# Patient Record
Sex: FEMALE | Race: OTHER | Employment: FULL TIME | URBAN - METROPOLITAN AREA
[De-identification: names, ages, dates, MRNs, and addresses within clinical notes are randomized per-mention and may not be internally consistent; named-entity substitution may affect disease eponyms.]

---
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
## 2019-01-06 ENCOUNTER — APPOINTMENT (OUTPATIENT)
Dept: GENERAL RADIOLOGY | Age: 19
End: 2019-01-06
Payer: COMMERCIAL

## 2019-01-06 ENCOUNTER — HOSPITAL ENCOUNTER (EMERGENCY)
Age: 19
Discharge: HOME | End: 2019-01-06
Attending: EMERGENCY MEDICINE
Payer: COMMERCIAL

## 2019-01-06 VITALS
BODY MASS INDEX: 21.34 KG/M2 | RESPIRATION RATE: 17 BRPM | DIASTOLIC BLOOD PRESSURE: 68 MMHG | SYSTOLIC BLOOD PRESSURE: 116 MMHG | HEIGHT: 64 IN | HEART RATE: 65 BPM | TEMPERATURE: 97.9 F | OXYGEN SATURATION: 100 % | WEIGHT: 125 LBS

## 2019-01-06 DIAGNOSIS — R05.9 COUGH: Primary | ICD-10-CM

## 2019-01-06 LAB
RAPID INFLUENZA  B AGN: NEGATIVE
RAPID INFLUENZA A AGN: NEGATIVE

## 2019-01-06 PROCEDURE — 71045 X-RAY EXAM CHEST 1 VIEW: CPT

## 2019-01-06 PROCEDURE — 99283 EMERGENCY DEPT VISIT LOW MDM: CPT

## 2019-01-06 PROCEDURE — 96374 THER/PROPH/DIAG INJ IV PUSH: CPT

## 2019-01-06 PROCEDURE — 94640 AIRWAY INHALATION TREATMENT: CPT

## 2019-01-06 PROCEDURE — 87804 INFLUENZA ASSAY W/OPTIC: CPT

## 2019-01-06 PROCEDURE — 6360000002 HC RX W HCPCS: Performed by: PERSONAL EMERGENCY RESPONSE ATTENDANT

## 2019-01-06 PROCEDURE — 6370000000 HC RX 637 (ALT 250 FOR IP): Performed by: PERSONAL EMERGENCY RESPONSE ATTENDANT

## 2019-01-06 RX ORDER — DEXAMETHASONE SODIUM PHOSPHATE 10 MG/ML
10 INJECTION INTRAMUSCULAR; INTRAVENOUS ONCE
Status: COMPLETED | OUTPATIENT
Start: 2019-01-06 | End: 2019-01-06

## 2019-01-06 RX ORDER — ALBUTEROL SULFATE 90 UG/1
2 AEROSOL, METERED RESPIRATORY (INHALATION) EVERY 6 HOURS PRN
COMMUNITY
End: 2019-01-06

## 2019-01-06 RX ORDER — AZITHROMYCIN 250 MG/1
TABLET, FILM COATED ORAL
Qty: 1 PACKET | Refills: 0 | Status: SHIPPED | OUTPATIENT
Start: 2019-01-06 | End: 2019-01-16

## 2019-01-06 RX ORDER — ALBUTEROL SULFATE 2.5 MG/3ML
2.5 SOLUTION RESPIRATORY (INHALATION) EVERY 6 HOURS PRN
Qty: 120 EACH | Refills: 0 | Status: SHIPPED | OUTPATIENT
Start: 2019-01-06

## 2019-01-06 RX ORDER — IPRATROPIUM BROMIDE AND ALBUTEROL SULFATE 2.5; .5 MG/3ML; MG/3ML
1 SOLUTION RESPIRATORY (INHALATION) CONTINUOUS PRN
Status: DISCONTINUED | OUTPATIENT
Start: 2019-01-06 | End: 2019-01-06 | Stop reason: HOSPADM

## 2019-01-06 RX ORDER — CODEINE PHOSPHATE AND GUAIFENESIN 10; 100 MG/5ML; MG/5ML
10 SOLUTION ORAL ONCE
Status: COMPLETED | OUTPATIENT
Start: 2019-01-06 | End: 2019-01-06

## 2019-01-06 RX ADMIN — IPRATROPIUM BROMIDE AND ALBUTEROL SULFATE 1 AMPULE: .5; 3 SOLUTION RESPIRATORY (INHALATION) at 01:28

## 2019-01-06 RX ADMIN — GUAIFENESIN AND CODEINE PHOSPHATE 10 ML: 10; 100 LIQUID ORAL at 01:46

## 2019-01-06 RX ADMIN — DEXAMETHASONE SODIUM PHOSPHATE 10 MG: 10 INJECTION INTRAMUSCULAR; INTRAVENOUS at 01:46

## 2019-01-06 ASSESSMENT — ENCOUNTER SYMPTOMS
VOMITING: 0
COLOR CHANGE: 0
BLOOD IN STOOL: 0
COUGH: 1
ABDOMINAL PAIN: 0
SHORTNESS OF BREATH: 1
RHINORRHEA: 0
DIARRHEA: 0
SORE THROAT: 1
NAUSEA: 0

## 2019-01-06 ASSESSMENT — PAIN DESCRIPTION - DESCRIPTORS: DESCRIPTORS: ACHING

## 2019-01-06 ASSESSMENT — PAIN SCALES - GENERAL: PAINLEVEL_OUTOF10: 10

## 2019-01-06 ASSESSMENT — PAIN DESCRIPTION - LOCATION: LOCATION: ABDOMEN;CHEST

## 2019-01-06 ASSESSMENT — PAIN DESCRIPTION - PAIN TYPE: TYPE: ACUTE PAIN

## 2019-02-28 ENCOUNTER — HOSPITAL ENCOUNTER (EMERGENCY)
Age: 19
Discharge: HOME | End: 2019-02-28
Payer: COMMERCIAL

## 2019-02-28 VITALS
BODY MASS INDEX: 23.05 KG/M2 | TEMPERATURE: 102.2 F | RESPIRATION RATE: 19 BRPM | HEIGHT: 64 IN | WEIGHT: 135 LBS | SYSTOLIC BLOOD PRESSURE: 138 MMHG | OXYGEN SATURATION: 95 % | HEART RATE: 93 BPM | DIASTOLIC BLOOD PRESSURE: 77 MMHG

## 2019-02-28 DIAGNOSIS — J10.1 INFLUENZA A: Primary | ICD-10-CM

## 2019-02-28 LAB
RAPID INFLUENZA  B AGN: NEGATIVE
RAPID INFLUENZA A AGN: POSITIVE
S PYO AG THROAT QL: NEGATIVE

## 2019-02-28 PROCEDURE — 87804 INFLUENZA ASSAY W/OPTIC: CPT

## 2019-02-28 PROCEDURE — 99283 EMERGENCY DEPT VISIT LOW MDM: CPT

## 2019-02-28 PROCEDURE — 87880 STREP A ASSAY W/OPTIC: CPT

## 2019-02-28 PROCEDURE — 6370000000 HC RX 637 (ALT 250 FOR IP): Performed by: PHYSICIAN ASSISTANT

## 2019-02-28 PROCEDURE — 87081 CULTURE SCREEN ONLY: CPT

## 2019-02-28 RX ORDER — ACETAMINOPHEN 500 MG
1000 TABLET ORAL ONCE
Status: COMPLETED | OUTPATIENT
Start: 2019-02-28 | End: 2019-02-28

## 2019-02-28 RX ORDER — PREDNISONE 50 MG/1
50 TABLET ORAL DAILY
Qty: 5 TABLET | Refills: 0 | Status: SHIPPED | OUTPATIENT
Start: 2019-02-28 | End: 2019-03-05

## 2019-02-28 RX ORDER — OSELTAMIVIR PHOSPHATE 75 MG/1
75 CAPSULE ORAL 2 TIMES DAILY
Qty: 10 CAPSULE | Refills: 0 | Status: SHIPPED | OUTPATIENT
Start: 2019-02-28 | End: 2019-03-05

## 2019-02-28 RX ORDER — BENZONATATE 100 MG/1
100 CAPSULE ORAL ONCE
Status: COMPLETED | OUTPATIENT
Start: 2019-02-28 | End: 2019-02-28

## 2019-02-28 RX ORDER — BENZONATATE 100 MG/1
100 CAPSULE ORAL 2 TIMES DAILY PRN
Qty: 20 CAPSULE | Refills: 0 | Status: SHIPPED | OUTPATIENT
Start: 2019-02-28 | End: 2019-03-07

## 2019-02-28 RX ORDER — PREDNISONE 20 MG/1
60 TABLET ORAL ONCE
Status: COMPLETED | OUTPATIENT
Start: 2019-02-28 | End: 2019-02-28

## 2019-02-28 RX ADMIN — BENZONATATE 100 MG: 100 CAPSULE ORAL at 10:15

## 2019-02-28 RX ADMIN — PREDNISONE 60 MG: 20 TABLET ORAL at 10:15

## 2019-02-28 RX ADMIN — ACETAMINOPHEN 1000 MG: 500 TABLET ORAL at 10:15

## 2019-02-28 ASSESSMENT — PAIN DESCRIPTION - FREQUENCY: FREQUENCY: CONTINUOUS

## 2019-02-28 ASSESSMENT — ENCOUNTER SYMPTOMS
SHORTNESS OF BREATH: 0
DIARRHEA: 0
RHINORRHEA: 1
COUGH: 1
ABDOMINAL PAIN: 0
VOMITING: 0
EYE PAIN: 0
NAUSEA: 0
PHOTOPHOBIA: 0
BACK PAIN: 0
SORE THROAT: 1

## 2019-02-28 ASSESSMENT — PAIN DESCRIPTION - PAIN TYPE: TYPE: ACUTE PAIN

## 2019-02-28 ASSESSMENT — PAIN SCALES - GENERAL
PAINLEVEL_OUTOF10: 10
PAINLEVEL_OUTOF10: 10

## 2019-02-28 ASSESSMENT — PAIN DESCRIPTION - DESCRIPTORS: DESCRIPTORS: ACHING

## 2019-02-28 ASSESSMENT — PAIN DESCRIPTION - LOCATION: LOCATION: GENERALIZED;HEAD

## 2019-03-02 LAB — S PYO THROAT QL CULT: NORMAL

## 2021-02-21 ENCOUNTER — APPOINTMENT (OUTPATIENT)
Dept: GENERAL RADIOLOGY | Age: 21
End: 2021-02-21
Payer: COMMERCIAL

## 2021-02-21 ENCOUNTER — HOSPITAL ENCOUNTER (EMERGENCY)
Age: 21
Discharge: HOME | End: 2021-02-21
Attending: EMERGENCY MEDICINE
Payer: COMMERCIAL

## 2021-02-21 VITALS
BODY MASS INDEX: 30.56 KG/M2 | RESPIRATION RATE: 20 BRPM | SYSTOLIC BLOOD PRESSURE: 151 MMHG | HEART RATE: 88 BPM | HEIGHT: 64 IN | OXYGEN SATURATION: 99 % | TEMPERATURE: 98 F | DIASTOLIC BLOOD PRESSURE: 98 MMHG | WEIGHT: 179 LBS

## 2021-02-21 DIAGNOSIS — K59.00 CONSTIPATION, UNSPECIFIED CONSTIPATION TYPE: Primary | ICD-10-CM

## 2021-02-21 DIAGNOSIS — R10.9 ABDOMINAL PAIN, UNSPECIFIED ABDOMINAL LOCATION: ICD-10-CM

## 2021-02-21 LAB
BACTERIA: NEGATIVE /HPF
BILIRUBIN URINE: NEGATIVE
BLOOD, URINE: ABNORMAL
CHP ED QC CHECK: YES
CLARITY: CLEAR
COLOR: YELLOW
EPITHELIAL CELLS, UA: NORMAL /HPF (ref 0–5)
GLUCOSE URINE: NEGATIVE MG/DL
HYALINE CASTS: NORMAL /HPF (ref 0–5)
KETONES, URINE: NEGATIVE MG/DL
LEUKOCYTE ESTERASE, URINE: NEGATIVE
NITRITE, URINE: NEGATIVE
PH UA: 7 (ref 5–9)
PREGNANCY TEST URINE, POC: NEGATIVE
PROTEIN UA: NEGATIVE MG/DL
RBC UA: NORMAL /HPF (ref 0–5)
SPECIFIC GRAVITY UA: 1 (ref 1–1.03)
UROBILINOGEN, URINE: 0.2 E.U./DL
WBC UA: NORMAL /HPF (ref 0–5)

## 2021-02-21 PROCEDURE — 99283 EMERGENCY DEPT VISIT LOW MDM: CPT

## 2021-02-21 PROCEDURE — 74018 RADEX ABDOMEN 1 VIEW: CPT

## 2021-02-21 PROCEDURE — 81001 URINALYSIS AUTO W/SCOPE: CPT

## 2021-02-21 PROCEDURE — 6370000000 HC RX 637 (ALT 250 FOR IP): Performed by: EMERGENCY MEDICINE

## 2021-02-21 RX ORDER — DOCUSATE SODIUM 100 MG/1
100 CAPSULE, LIQUID FILLED ORAL 2 TIMES DAILY
Qty: 60 CAPSULE | Refills: 0 | Status: SHIPPED | OUTPATIENT
Start: 2021-02-21 | End: 2021-03-23

## 2021-02-21 RX ADMIN — MAGNESIUM CITRATE 296 ML: 1.75 LIQUID ORAL at 12:46

## 2021-02-21 ASSESSMENT — ENCOUNTER SYMPTOMS
VOMITING: 0
CONSTIPATION: 1
BACK PAIN: 0
SHORTNESS OF BREATH: 0
COUGH: 0
ABDOMINAL PAIN: 1
SORE THROAT: 0
NAUSEA: 0
DIARRHEA: 0

## 2021-02-21 ASSESSMENT — PAIN DESCRIPTION - ONSET: ONSET: ON-GOING

## 2021-02-21 ASSESSMENT — PAIN DESCRIPTION - PAIN TYPE: TYPE: ACUTE PAIN

## 2021-02-21 ASSESSMENT — PAIN DESCRIPTION - LOCATION: LOCATION: ABDOMEN

## 2021-02-21 ASSESSMENT — PAIN DESCRIPTION - PROGRESSION: CLINICAL_PROGRESSION: GRADUALLY WORSENING

## 2021-02-21 NOTE — ED TRIAGE NOTES
Patient arrived from home via mother with complaint of constipation for the last 2 weeks. Per patient she has gained a lot of weight recently due to her birth control. patient A&OX4. Patient stated she went a little yesterday, but stool was hard. Patient denies nausea, vomiting.

## 2021-02-21 NOTE — ED NOTES
Pt states that she is unable to void at this time. Ice water given to pt.       [address] [facility] V, RN  02/21/21 3402

## 2021-02-21 NOTE — ED PROVIDER NOTES
[address] [facility] ED  eMERGENCYdEPARTMENT eNCOUnter      Pt Name: Melissa Mast  MRN: [id number]  [place] 2000  Date of evaluation: 2/21/2021  Mario Fuentes MD    CHIEF COMPLAINT           HPI  Melissa Mast is a 23 y.o. female per chart review has no pmh presents to the ED with constipation, ab cramping. Pt notes she has not had a full BM in 2 weeks. Pt had a small hard BM yesterday morning. Pt notes mild, intermittent, cramping, LLQ ab pain. Pt currently denies ab pain. Pt denies fever, n/v, cp, sob, dysuria, diarrhea. ROS  Review of Systems   Constitutional: Negative for activity change, chills and fever. HENT: Negative for ear pain and sore throat. Eyes: Negative for visual disturbance. Respiratory: Negative for cough and shortness of breath. Cardiovascular: Negative for chest pain, palpitations and leg swelling. Gastrointestinal: Positive for abdominal pain and constipation. Negative for diarrhea, nausea and vomiting. Genitourinary: Negative for dysuria. Musculoskeletal: Negative for back pain. Skin: Negative for rash. Neurological: Negative for dizziness and weakness. Except as noted above the remainder of the review of systems was reviewed and negative. PAST MEDICAL HISTORY     Past Medical History:   Diagnosis Date    Ventricular tachycardia ([address]          SURGICAL HISTORY       Past Surgical History:   Procedure Laterality Date    VENTRICULAR ABLATION SURGERY           CURRENTMEDICATIONS       Previous Medications    ALBUTEROL (PROVENTIL) (2.5 MG/3ML) 0.083% NEBULIZER SOLUTION    Take 3 mLs by nebulization every 6 hours as needed for Wheezing    HYDROCORTISONE 2.5 % CREAM    Apply topically 2 times daily. ALLERGIES     Patient has no known allergies. FAMILY HISTORY     History reviewed. No pertinent family history.        SOCIAL HISTORY       Social History     Socioeconomic History    Marital status: Single     Spouse name: None    Number of children: None    Years of education: None    Highest education level: None   Occupational History    None   Social Needs    Financial resource strain: None    Food insecurity     Worry: None     Inability: None    Transportation needs     Medical: None     Non-medical: None   Tobacco Use    Smoking status: Never Smoker    Smokeless tobacco: Never Used   Substance and Sexual Activity    Alcohol use: Yes     Comment: occasionally    Drug use: No    Sexual activity: Never   Lifestyle    Physical activity     Days per week: None     Minutes per session: None    Stress: None   Relationships    Social connections     Talks on phone: None     Gets together: None     Attends Confucianist service: None     Active member of club or organization: None     Attends meetings of clubs or organizations: None     Relationship status: None    Intimate partner violence     Fear of current or ex partner: None     Emotionally abused: None     Physically abused: None     Forced sexual activity: None   Other Topics Concern    None   Social History Narrative    None         PHYSICAL EXAM       ED Triage Vitals [02/21/21 1117]   BP Temp Temp Source Pulse Resp SpO2 Height Weight   (!) 151/98 98 °F (36.7 °C) Oral 88 20 99 % 5' 4\" (1.626 m) 179 lb (81.2 kg)       Physical Exam  Vitals signs and nursing note reviewed. Constitutional:       Appearance: She is well-developed. HENT:      Head: Normocephalic. Right Ear: External ear normal.      Left Ear: External ear normal.   Eyes:      Conjunctiva/sclera: Conjunctivae normal.      Pupils: Pupils are equal, round, and reactive to light. Neck:      Musculoskeletal: Normal range of motion and neck supple. Cardiovascular:      Rate and Rhythm: Normal rate and regular rhythm. Heart sounds: Normal heart sounds. Pulmonary:      Effort: Pulmonary effort is normal.      Breath sounds: Normal breath sounds.    Abdominal:      General: Bowel sounds are normal. There is no distension. Palpations: Abdomen is soft. Tenderness: There is no abdominal tenderness. Musculoskeletal: Normal range of motion. Skin:     General: Skin is warm and dry. Neurological:      Mental Status: She is alert and oriented to person, place, and time. Psychiatric:         Mood and Affect: Mood normal.           MDM  22 yo female presents to the ED with ab pain, constipation. Pt is afebrile, hemodynamically stable. Pt currently without any ab pain. HCG negative. KUB shows constipation and nonspecific gas pattern. Pt educated about constipation and diet. Pt eats mostly junk food. Pt given PO Mg citrate in the ED. Pt given prescription for colace. Pt will start eating apples everyday. Pt given ab pain, constipation warning signs and will f/u with pcp. Pt understands plan. FINAL IMPRESSION      1. Constipation, unspecified constipation type    2.  Abdominal pain, unspecified abdominal location          DISPOSITION/PLAN   DISPOSITION Decision To Discharge 02/21/2021 12:43:35 PM        DISCHARGE MEDICATIONS:  [unfilled]         Dean Osman MD(electronically signed)  Attending Emergency Physician            Dean Osman MD  02/21/21 7957

## 2023-03-15 ENCOUNTER — HOSPITAL ENCOUNTER (OUTPATIENT)
Dept: ULTRASOUND IMAGING | Age: 23
Discharge: HOME | End: 2023-03-17
Payer: COMMERCIAL

## 2023-03-15 DIAGNOSIS — Z34.02 ENCOUNTER FOR SUPERVISION OF NORMAL FIRST PREGNANCY IN SECOND TRIMESTER: ICD-10-CM

## 2023-03-15 PROCEDURE — 76805 OB US >/= 14 WKS SNGL FETUS: CPT

## 2023-04-25 ENCOUNTER — TRANSCRIBE ORDERS (OUTPATIENT)
Dept: ADMINISTRATIVE | Age: 23
End: 2023-04-25

## 2023-04-25 DIAGNOSIS — R00.2 PALPITATIONS: Primary | ICD-10-CM

## 2023-04-27 ENCOUNTER — HOSPITAL ENCOUNTER (OUTPATIENT)
Dept: NON INVASIVE DIAGNOSTICS | Age: 23
Discharge: HOME | End: 2023-04-27

## 2023-04-27 DIAGNOSIS — R00.2 PALPITATIONS: ICD-10-CM

## 2023-04-30 LAB
EKG ATRIAL RATE: 72 BPM
EKG P AXIS: 27 DEGREES
EKG P-R INTERVAL: 110 MS
EKG Q-T INTERVAL: 384 MS
EKG QRS DURATION: 84 MS
EKG QTC CALCULATION (BAZETT): 420 MS
EKG R AXIS: 15 DEGREES
EKG T AXIS: 26 DEGREES
EKG VENTRICULAR RATE: 72 BPM

## 2023-06-09 ENCOUNTER — HOSPITAL ENCOUNTER (OUTPATIENT)
Dept: NON INVASIVE DIAGNOSTICS | Age: 23
Discharge: HOME | End: 2023-06-09
Payer: COMMERCIAL

## 2023-06-09 DIAGNOSIS — R00.2 PALPITATIONS: ICD-10-CM

## 2023-06-09 LAB
LV EF: 60 %
LVEF MODALITY: NORMAL

## 2023-06-09 PROCEDURE — 93306 TTE W/DOPPLER COMPLETE: CPT

## 2023-07-07 ENCOUNTER — HOSPITAL ENCOUNTER (OUTPATIENT)
Dept: ULTRASOUND IMAGING | Age: 23
End: 2023-07-07
Payer: COMMERCIAL

## 2023-07-07 PROCEDURE — 76815 OB US LIMITED FETUS(S): CPT

## 2023-07-26 ENCOUNTER — HOSPITAL ENCOUNTER (OUTPATIENT)
Age: 23
Discharge: HOME | End: 2023-07-26
Attending: OBSTETRICS & GYNECOLOGY | Admitting: OBSTETRICS & GYNECOLOGY
Payer: COMMERCIAL

## 2023-07-26 VITALS
DIASTOLIC BLOOD PRESSURE: 69 MMHG | HEART RATE: 75 BPM | RESPIRATION RATE: 16 BRPM | OXYGEN SATURATION: 97 % | SYSTOLIC BLOOD PRESSURE: 135 MMHG | TEMPERATURE: 98.6 F

## 2023-07-26 PROCEDURE — 59025 FETAL NON-STRESS TEST: CPT

## 2023-07-26 NOTE — PROGRESS NOTES
Patient arrived to triage. Patient was told to come over from the office for decreased fetal movement and bleeding. Patient was checked in office today: 1/50/-2. Patient placed on EFM and explained. Patient denies HA, nausea, vomiting, diarrhea, constipation, blurred vision, and no sexual intercourse in the past 48 hours. Vital signs taken.

## 2023-07-27 NOTE — PROGRESS NOTES
Dr. Shaye Brothers bedside discussing plan of care with patient. Informed patients strip was reactive. Dr. Shaye Brothers informing patient she is good to be discharged.

## 2023-08-07 ENCOUNTER — APPOINTMENT (OUTPATIENT)
Dept: LABOR AND DELIVERY | Age: 23
End: 2023-08-07
Payer: COMMERCIAL

## 2023-08-07 ENCOUNTER — ANESTHESIA EVENT (OUTPATIENT)
Dept: LABOR AND DELIVERY | Age: 23
End: 2023-08-07
Payer: COMMERCIAL

## 2023-08-07 ENCOUNTER — HOSPITAL ENCOUNTER (INPATIENT)
Age: 23
LOS: 2 days | Discharge: HOME | End: 2023-08-09
Attending: OBSTETRICS & GYNECOLOGY | Admitting: OBSTETRICS & GYNECOLOGY
Payer: COMMERCIAL

## 2023-08-07 ENCOUNTER — ANESTHESIA (OUTPATIENT)
Dept: LABOR AND DELIVERY | Age: 23
End: 2023-08-07
Payer: COMMERCIAL

## 2023-08-07 PROBLEM — Z34.93 NORMAL PREGNANCY IN THIRD TRIMESTER: Status: ACTIVE | Noted: 2023-08-07

## 2023-08-07 PROBLEM — Z78.9 ADMITTED TO LABOR AND DELIVERY: Status: ACTIVE | Noted: 2023-08-07

## 2023-08-07 PROBLEM — O24.410 DIET CONTROLLED GESTATIONAL DIABETES MELLITUS (GDM) IN THIRD TRIMESTER: Status: ACTIVE | Noted: 2023-08-07

## 2023-08-07 LAB
ABO + RH BLD: NORMAL
ALBUMIN SERPL-MCNC: 3.2 G/DL (ref 3.5–4.6)
ALP SERPL-CCNC: 264 U/L (ref 40–130)
ALT SERPL-CCNC: 17 U/L (ref 0–33)
AMPHET UR QL SCN: NORMAL
ANION GAP SERPL CALCULATED.3IONS-SCNC: 11 MEQ/L (ref 9–15)
AST SERPL-CCNC: 24 U/L (ref 0–35)
BACTERIA URNS QL MICRO: NEGATIVE /HPF
BARBITURATES UR QL SCN: NORMAL
BASOPHILS # BLD: 0 K/UL (ref 0–0.2)
BASOPHILS NFR BLD: 0.2 %
BENZODIAZ UR QL SCN: NORMAL
BILIRUB SERPL-MCNC: <0.2 MG/DL (ref 0.2–0.7)
BILIRUB UR QL STRIP: NEGATIVE
BLD GP AB SCN SERPL QL: NORMAL
BUN SERPL-MCNC: 7 MG/DL (ref 6–20)
CALCIUM SERPL-MCNC: 9.4 MG/DL (ref 8.5–9.9)
CANNABINOIDS UR QL SCN: NORMAL
CHLORIDE SERPL-SCNC: 105 MEQ/L (ref 95–107)
CLARITY UR: CLEAR
CO2 SERPL-SCNC: 20 MEQ/L (ref 20–31)
COCAINE UR QL SCN: NORMAL
COLOR UR: YELLOW
CREAT SERPL-MCNC: 0.5 MG/DL (ref 0.5–0.9)
DRUG SCREEN COMMENT UR-IMP: NORMAL
EOSINOPHIL # BLD: 0 K/UL (ref 0–0.7)
EOSINOPHIL NFR BLD: 0.3 %
EPI CELLS #/AREA URNS AUTO: ABNORMAL /HPF (ref 0–5)
ERYTHROCYTE [DISTWIDTH] IN BLOOD BY AUTOMATED COUNT: 14 % (ref 11.5–14.5)
FENTANYL SCREEN, URINE: NORMAL
GLOBULIN SER CALC-MCNC: 3.8 G/DL (ref 2.3–3.5)
GLUCOSE SERPL-MCNC: 115 MG/DL (ref 70–99)
GLUCOSE UR STRIP-MCNC: NEGATIVE MG/DL
HBV SURFACE AG SERPL QL IA: NORMAL
HCT VFR BLD AUTO: 39.1 % (ref 37–47)
HGB BLD-MCNC: 13.2 G/DL (ref 12–16)
HGB UR QL STRIP: ABNORMAL
HIV AG/AB: NONREACTIVE
HYALINE CASTS #/AREA URNS AUTO: ABNORMAL /HPF (ref 0–5)
KETONES UR STRIP-MCNC: NEGATIVE MG/DL
LEUKOCYTE ESTERASE UR QL STRIP: NEGATIVE
LYMPHOCYTES # BLD: 2 K/UL (ref 1–4.8)
LYMPHOCYTES NFR BLD: 19.7 %
MCH RBC QN AUTO: 29.9 PG (ref 27–31.3)
MCHC RBC AUTO-ENTMCNC: 33.8 % (ref 33–37)
MCV RBC AUTO: 88.4 FL (ref 79.4–94.8)
METHADONE UR QL SCN: NORMAL
MONOCYTES # BLD: 0.9 K/UL (ref 0.2–0.8)
MONOCYTES NFR BLD: 8.4 %
NEUTROPHILS # BLD: 7.4 K/UL (ref 1.4–6.5)
NEUTS SEG NFR BLD: 71.4 %
NITRITE UR QL STRIP: NEGATIVE
OPIATES UR QL SCN: NORMAL
OXYCODONE UR QL SCN: NORMAL
PCP UR QL SCN: NORMAL
PH UR STRIP: 7 [PH] (ref 5–9)
PLATELET # BLD AUTO: 176 K/UL (ref 130–400)
POTASSIUM SERPL-SCNC: 4.4 MEQ/L (ref 3.4–4.9)
PROPOXYPH UR QL SCN: NORMAL
PROT SERPL-MCNC: 7 G/DL (ref 6.3–8)
PROT UR STRIP-MCNC: NEGATIVE MG/DL
RBC # BLD AUTO: 4.42 M/UL (ref 4.2–5.4)
RBC #/AREA URNS AUTO: ABNORMAL /HPF (ref 0–5)
RPR SER QL: NORMAL
SODIUM SERPL-SCNC: 136 MEQ/L (ref 135–144)
SP GR UR STRIP: 1.01 (ref 1–1.03)
URINE REFLEX TO CULTURE: ABNORMAL
UROBILINOGEN UR STRIP-ACNC: 0.2 E.U./DL
WBC # BLD AUTO: 10.4 K/UL (ref 4.8–10.8)
WBC #/AREA URNS AUTO: ABNORMAL /HPF (ref 0–5)

## 2023-08-07 PROCEDURE — 59409 OBSTETRICAL CARE: CPT | Performed by: OBSTETRICS & GYNECOLOGY

## 2023-08-07 PROCEDURE — 6370000000 HC RX 637 (ALT 250 FOR IP): Performed by: OBSTETRICS & GYNECOLOGY

## 2023-08-07 PROCEDURE — 86900 BLOOD TYPING SEROLOGIC ABO: CPT

## 2023-08-07 PROCEDURE — 6360000002 HC RX W HCPCS: Performed by: OBSTETRICS & GYNECOLOGY

## 2023-08-07 PROCEDURE — 80053 COMPREHEN METABOLIC PANEL: CPT

## 2023-08-07 PROCEDURE — 85025 COMPLETE CBC W/AUTO DIFF WBC: CPT

## 2023-08-07 PROCEDURE — 80307 DRUG TEST PRSMV CHEM ANLYZR: CPT

## 2023-08-07 PROCEDURE — 81001 URINALYSIS AUTO W/SCOPE: CPT

## 2023-08-07 PROCEDURE — 2500000003 HC RX 250 WO HCPCS: Performed by: OBSTETRICS & GYNECOLOGY

## 2023-08-07 PROCEDURE — 87340 HEPATITIS B SURFACE AG IA: CPT

## 2023-08-07 PROCEDURE — 86901 BLOOD TYPING SEROLOGIC RH(D): CPT

## 2023-08-07 PROCEDURE — 3700000025 EPIDURAL BLOCK: Performed by: NURSE ANESTHETIST, CERTIFIED REGISTERED

## 2023-08-07 PROCEDURE — 86850 RBC ANTIBODY SCREEN: CPT

## 2023-08-07 PROCEDURE — 86592 SYPHILIS TEST NON-TREP QUAL: CPT

## 2023-08-07 PROCEDURE — 2580000003 HC RX 258: Performed by: OBSTETRICS & GYNECOLOGY

## 2023-08-07 PROCEDURE — 1220000000 HC SEMI PRIVATE OB R&B

## 2023-08-07 PROCEDURE — 87389 HIV-1 AG W/HIV-1&-2 AB AG IA: CPT

## 2023-08-07 RX ORDER — SODIUM CHLORIDE 0.9 % (FLUSH) 0.9 %
5-40 SYRINGE (ML) INJECTION PRN
Status: DISCONTINUED | OUTPATIENT
Start: 2023-08-07 | End: 2023-08-07

## 2023-08-07 RX ORDER — SODIUM CHLORIDE 0.9 % (FLUSH) 0.9 %
5-40 SYRINGE (ML) INJECTION PRN
Status: DISCONTINUED | OUTPATIENT
Start: 2023-08-07 | End: 2023-08-09 | Stop reason: HOSPADM

## 2023-08-07 RX ORDER — MISOPROSTOL 200 UG/1
400 TABLET ORAL PRN
Status: DISCONTINUED | OUTPATIENT
Start: 2023-08-07 | End: 2023-08-07

## 2023-08-07 RX ORDER — MONTELUKAST SODIUM 10 MG/1
10 TABLET ORAL NIGHTLY
COMMUNITY

## 2023-08-07 RX ORDER — SODIUM CHLORIDE 0.9 % (FLUSH) 0.9 %
5-40 SYRINGE (ML) INJECTION EVERY 12 HOURS SCHEDULED
Status: DISCONTINUED | OUTPATIENT
Start: 2023-08-07 | End: 2023-08-07

## 2023-08-07 RX ORDER — ONDANSETRON 2 MG/ML
4 INJECTION INTRAMUSCULAR; INTRAVENOUS EVERY 6 HOURS PRN
Status: DISCONTINUED | OUTPATIENT
Start: 2023-08-07 | End: 2023-08-07

## 2023-08-07 RX ORDER — SODIUM CHLORIDE, SODIUM LACTATE, POTASSIUM CHLORIDE, CALCIUM CHLORIDE 600; 310; 30; 20 MG/100ML; MG/100ML; MG/100ML; MG/100ML
INJECTION, SOLUTION INTRAVENOUS CONTINUOUS
Status: DISCONTINUED | OUTPATIENT
Start: 2023-08-07 | End: 2023-08-07

## 2023-08-07 RX ORDER — SODIUM CHLORIDE, SODIUM LACTATE, POTASSIUM CHLORIDE, AND CALCIUM CHLORIDE .6; .31; .03; .02 G/100ML; G/100ML; G/100ML; G/100ML
500 INJECTION, SOLUTION INTRAVENOUS PRN
Status: DISCONTINUED | OUTPATIENT
Start: 2023-08-07 | End: 2023-08-07

## 2023-08-07 RX ORDER — IBUPROFEN 600 MG/1
600 TABLET ORAL EVERY 8 HOURS SCHEDULED
Status: DISCONTINUED | OUTPATIENT
Start: 2023-08-07 | End: 2023-08-09 | Stop reason: HOSPADM

## 2023-08-07 RX ORDER — DOCUSATE SODIUM 100 MG/1
100 CAPSULE, LIQUID FILLED ORAL 2 TIMES DAILY PRN
Status: DISCONTINUED | OUTPATIENT
Start: 2023-08-07 | End: 2023-08-07

## 2023-08-07 RX ORDER — SODIUM CHLORIDE 9 MG/ML
INJECTION, SOLUTION INTRAVENOUS PRN
Status: DISCONTINUED | OUTPATIENT
Start: 2023-08-07 | End: 2023-08-07

## 2023-08-07 RX ORDER — DOCUSATE SODIUM 100 MG/1
100 CAPSULE, LIQUID FILLED ORAL 2 TIMES DAILY
Status: DISCONTINUED | OUTPATIENT
Start: 2023-08-07 | End: 2023-08-09 | Stop reason: HOSPADM

## 2023-08-07 RX ORDER — SODIUM CHLORIDE 9 MG/ML
INJECTION, SOLUTION INTRAVENOUS PRN
Status: DISCONTINUED | OUTPATIENT
Start: 2023-08-07 | End: 2023-08-09 | Stop reason: HOSPADM

## 2023-08-07 RX ORDER — NALBUPHINE HYDROCHLORIDE 20 MG/ML
5 INJECTION, SOLUTION INTRAMUSCULAR; INTRAVENOUS; SUBCUTANEOUS
Status: DISCONTINUED | OUTPATIENT
Start: 2023-08-07 | End: 2023-08-07

## 2023-08-07 RX ORDER — METHYLERGONOVINE MALEATE 0.2 MG/ML
200 INJECTION INTRAVENOUS PRN
Status: DISCONTINUED | OUTPATIENT
Start: 2023-08-07 | End: 2023-08-07

## 2023-08-07 RX ORDER — NALOXONE HYDROCHLORIDE 0.4 MG/ML
INJECTION, SOLUTION INTRAMUSCULAR; INTRAVENOUS; SUBCUTANEOUS PRN
Status: DISCONTINUED | OUTPATIENT
Start: 2023-08-07 | End: 2023-08-07

## 2023-08-07 RX ORDER — MODIFIED LANOLIN
OINTMENT (GRAM) TOPICAL PRN
Status: DISCONTINUED | OUTPATIENT
Start: 2023-08-07 | End: 2023-08-09 | Stop reason: HOSPADM

## 2023-08-07 RX ORDER — PENICILLIN G 3000000 [IU]/50ML
3 INJECTION, SOLUTION INTRAVENOUS EVERY 4 HOURS
Status: DISCONTINUED | OUTPATIENT
Start: 2023-08-07 | End: 2023-08-07

## 2023-08-07 RX ORDER — CARBOPROST TROMETHAMINE 250 UG/ML
250 INJECTION, SOLUTION INTRAMUSCULAR PRN
Status: DISCONTINUED | OUTPATIENT
Start: 2023-08-07 | End: 2023-08-07

## 2023-08-07 RX ORDER — SODIUM CHLORIDE, SODIUM LACTATE, POTASSIUM CHLORIDE, CALCIUM CHLORIDE 600; 310; 30; 20 MG/100ML; MG/100ML; MG/100ML; MG/100ML
INJECTION, SOLUTION INTRAVENOUS CONTINUOUS
Status: DISCONTINUED | OUTPATIENT
Start: 2023-08-07 | End: 2023-08-09 | Stop reason: HOSPADM

## 2023-08-07 RX ORDER — FERROUS SULFATE 325(65) MG
325 TABLET ORAL 2 TIMES DAILY WITH MEALS
Status: DISCONTINUED | OUTPATIENT
Start: 2023-08-07 | End: 2023-08-09 | Stop reason: HOSPADM

## 2023-08-07 RX ORDER — SODIUM CHLORIDE, SODIUM LACTATE, POTASSIUM CHLORIDE, AND CALCIUM CHLORIDE .6; .31; .03; .02 G/100ML; G/100ML; G/100ML; G/100ML
1000 INJECTION, SOLUTION INTRAVENOUS PRN
Status: DISCONTINUED | OUTPATIENT
Start: 2023-08-07 | End: 2023-08-07

## 2023-08-07 RX ORDER — ACETAMINOPHEN 325 MG/1
650 TABLET ORAL EVERY 4 HOURS PRN
Status: DISCONTINUED | OUTPATIENT
Start: 2023-08-07 | End: 2023-08-07

## 2023-08-07 RX ORDER — DIPHENHYDRAMINE HCL 25 MG
25 TABLET ORAL EVERY 4 HOURS PRN
Status: DISCONTINUED | OUTPATIENT
Start: 2023-08-07 | End: 2023-08-07

## 2023-08-07 RX ORDER — SODIUM CHLORIDE 0.9 % (FLUSH) 0.9 %
5-40 SYRINGE (ML) INJECTION EVERY 12 HOURS SCHEDULED
Status: DISCONTINUED | OUTPATIENT
Start: 2023-08-07 | End: 2023-08-09 | Stop reason: HOSPADM

## 2023-08-07 RX ORDER — ACETAMINOPHEN 500 MG
1000 TABLET ORAL EVERY 8 HOURS SCHEDULED
Status: DISCONTINUED | OUTPATIENT
Start: 2023-08-07 | End: 2023-08-09 | Stop reason: HOSPADM

## 2023-08-07 RX ORDER — NICOTINE 21 MG/24HR
1 PATCH, TRANSDERMAL 24 HOURS TRANSDERMAL DAILY
Status: DISCONTINUED | OUTPATIENT
Start: 2023-08-07 | End: 2023-08-09 | Stop reason: HOSPADM

## 2023-08-07 RX ADMIN — Medication: at 08:51

## 2023-08-07 RX ADMIN — IBUPROFEN 600 MG: 600 TABLET, FILM COATED ORAL at 08:52

## 2023-08-07 RX ADMIN — ACETAMINOPHEN 1000 MG: 500 TABLET ORAL at 17:10

## 2023-08-07 RX ADMIN — Medication 6 ML: at 04:40

## 2023-08-07 RX ADMIN — SODIUM CHLORIDE, POTASSIUM CHLORIDE, SODIUM LACTATE AND CALCIUM CHLORIDE 1000 ML: 600; 310; 30; 20 INJECTION, SOLUTION INTRAVENOUS at 02:47

## 2023-08-07 RX ADMIN — ONDANSETRON 4 MG: 2 INJECTION INTRAMUSCULAR; INTRAVENOUS at 03:03

## 2023-08-07 RX ADMIN — SODIUM CHLORIDE, POTASSIUM CHLORIDE, SODIUM LACTATE AND CALCIUM CHLORIDE 1000 ML: 600; 310; 30; 20 INJECTION, SOLUTION INTRAVENOUS at 03:52

## 2023-08-07 RX ADMIN — ACETAMINOPHEN 1000 MG: 500 TABLET ORAL at 08:52

## 2023-08-07 RX ADMIN — Medication 11 ML/HR: at 04:43

## 2023-08-07 RX ADMIN — IBUPROFEN 600 MG: 600 TABLET, FILM COATED ORAL at 17:11

## 2023-08-07 RX ADMIN — DOCUSATE SODIUM 100 MG: 100 CAPSULE, LIQUID FILLED ORAL at 08:51

## 2023-08-07 RX ADMIN — SODIUM CHLORIDE 5 MILLION UNITS: 900 INJECTION INTRAVENOUS at 02:48

## 2023-08-07 RX ADMIN — DOCUSATE SODIUM 100 MG: 100 CAPSULE, LIQUID FILLED ORAL at 21:43

## 2023-08-07 RX ADMIN — Medication 1 MILLI-UNITS/MIN: at 06:21

## 2023-08-07 RX ADMIN — FERROUS SULFATE TAB 325 MG (65 MG ELEMENTAL FE) 325 MG: 325 (65 FE) TAB at 08:52

## 2023-08-07 RX ADMIN — BENZOCAINE AND LEVOMENTHOL: 200; 5 SPRAY TOPICAL at 08:51

## 2023-08-07 ASSESSMENT — PAIN DESCRIPTION - LOCATION: LOCATION: ABDOMEN

## 2023-08-07 ASSESSMENT — PAIN SCALES - GENERAL
PAINLEVEL_OUTOF10: 3
PAINLEVEL_OUTOF10: 0
PAINLEVEL_OUTOF10: 3
PAINLEVEL_OUTOF10: 3
PAINLEVEL_OUTOF10: 10

## 2023-08-07 ASSESSMENT — PAIN DESCRIPTION - DESCRIPTORS: DESCRIPTORS: CRAMPING

## 2023-08-07 NOTE — FLOWSHEET NOTE
Call placed to Dr. Francis Talbert to notify of SVE;  states he will not be available for delivery; house physician to deliver pt

## 2023-08-07 NOTE — FLOWSHEET NOTE
0403 pt turned from semi fowlers to L side  0405 pt to right side  0408 SVE 7/100/+1;  10 LO2 via nonrebreather mask  0410 L side  0412 CRNA at bedside  0414 R side  0416 large emesis  0417 semi fowlers   0421 pt sitting on bedpan to urinate  0425 sitting upright for epidural; o2 off  0429 timeout done  0430 procedure start  0438 test dose  0440 bolus dose  0443 pt supine with tilt; rate of 11 per CRNA

## 2023-08-07 NOTE — PROGRESS NOTES
Call made to Dr. Francis Talbert. Informed him patient has arrived, SVE is 6/90/0 and water broke around 0030. Orders received.

## 2023-08-07 NOTE — FLOWSHEET NOTE
PT arrived to [facility] ER with c/o karli and feeling like she has to poop. PT to 320 SVE done. PT states she had a big gush of fluid at 0030. Pt wearing a pad and pad wet with clear, odorless fluid. Pt states she also had some bloody show at home when up to the BR, no blood noticed on pad at this time. + fetal movement.

## 2023-08-07 NOTE — PROGRESS NOTES
Assisted patient up out of bed into the bathroom. Patient ambulated with little assistance and tolerated well. Patient able to void 600cc of blood tinged urine. Yasemin care completed. Dermoplast spray applied. Icepack, pad, and panties applied. Assisted patient with getting dressed into her own nursing bra and gown per her request. Patient ambulated back into the room from the bathroom unassisted and tolerated well. Wheeled patient from labor room 320 into postpartum room 328.

## 2023-08-07 NOTE — ANESTHESIA PROCEDURE NOTES
Epidural Block    Patient location during procedure: OB  Start time: 8/7/2023 4:29 AM  End time: 8/7/2023 4:38 AM  Reason for block: labor epidural  Staffing  Resident/CRNA: MELLO Alvarez - CRNA  Epidural  Patient position: sitting  Prep: Betadine and site prepped and draped  Patient monitoring: frequent blood pressure checks and continuous pulse ox  Approach: midline  Location: L4-5  Injection technique: KAVYA saline  Provider prep: mask and sterile gloves  Needle  Needle type: Tuohy   Needle gauge: 17 G  Needle length: 3.5 in  Needle insertion depth: 7 cm  Catheter type: side hole  Catheter size: 20 G  Test dose: positive  Kit: perifix  Lot number: [id number]  Expiration date: 11/30/2024Catheter Secured: tegaderm and tape  Assessment  Sensory level: T8  Hemodynamics: stable  Attempts: 1  Outcomes: uncomplicated and patient tolerated procedure well  Additional Notes  sterility  Preanesthetic Checklist  Completed: patient identified, IV checked, site marked, risks and benefits discussed, surgical/procedural consents, equipment checked, pre-op evaluation, timeout performed, anesthesia consent given, oxygen available, monitors applied/VS acknowledged, fire risk safety assessment completed and verbalized and blood product R/B/A discussed and consented

## 2023-08-07 NOTE — L&D DELIVERY SUMMARY NOTE
Vaginal Delivery :    Pre-operative Diagnosis:  Term pregnancy, Spontaneous labor, and Single fetus    Post-operative Diagnosis:  Living  infant(s) and Male     Anesthesia:  epidural anesthesia    Application and Delivery:  PROCEDURE NOTE:  VAGINAL DELIVERY   20 y.o. Madhuri Thomas at 39w5d who presented in active labor at 6 cm with SROM. Pt underwent  pitocin augmentation. Pt with epidural for pain management. Pt then with rectal pressure and the desire to push. PT was instructed on pushing efforts  and the infant's head was delivered atraumatically followed quickly by the rest of the body. Nuchal cord was present and reduced. The infant with spontaneous cry was then laid on the mother's abdomen and the cord remained intact for 1 minute for delayed cord clamping. The cord was then clamped and cut and the infant was placed for skin to skin care. The cord blood was then drawn for labs and the placenta delivered spontaneously. The vaginal and cervix were inspected and found to be intact. The infant, a viable male infant was born at  with Apgars 9 at 3 and 5 at 11 and wt pending  Mother was noted to have excellent uterine tone. Sponge and instrument counts were correct x 2 and mother and baby were recovered in room without difficulty. EBL : 200 ml    Delivery Summary:       Specimen:  none      Condition:  infant stable to general nursery and mother stable    Blood Type and Rh: B POS        Attending Attestation: I was present and scrubbed for the entire procedure. Darlin Ivey M.D., JOHANNA G

## 2023-08-07 NOTE — ANESTHESIA POSTPROCEDURE EVALUATION
Department of Anesthesiology  Postprocedure Note    Patient: Nataly Rodriguez  MRN: [id number]  YOB: 2000  Date of evaluation: 8/7/2023      Procedure Summary     Date: 08/07/23 Room / Location:     Anesthesia Start: 0412 Anesthesia Stop: 0726    Procedure: Labor Analgesia Diagnosis:     Scheduled Providers:  Responsible Provider: MELLO Owens CRNA    Anesthesia Type: epidural ASA Status: 2          Anesthesia Type: No value filed.     Kai Phase I: Kai Score: 10    Kai Phase II:        Anesthesia Post Evaluation    Patient location during evaluation: bedside  Patient participation: complete - patient participated  Level of consciousness: awake and awake and alert  Pain score: 0  Airway patency: patent  Nausea & Vomiting: no nausea and no vomiting  Complications: no  Cardiovascular status: blood pressure returned to baseline and hemodynamically stable  Respiratory status: acceptable  Hydration status: euvolemic  Pain management: adequate

## 2023-08-08 LAB
BASOPHILS # BLD: 0 K/UL (ref 0–0.2)
BASOPHILS NFR BLD: 0.2 %
EOSINOPHIL # BLD: 0.1 K/UL (ref 0–0.7)
EOSINOPHIL NFR BLD: 0.5 %
ERYTHROCYTE [DISTWIDTH] IN BLOOD BY AUTOMATED COUNT: 14.5 % (ref 11.5–14.5)
HCT VFR BLD AUTO: 37.5 % (ref 37–47)
HGB BLD-MCNC: 12.6 G/DL (ref 12–16)
LYMPHOCYTES # BLD: 2.5 K/UL (ref 1–4.8)
LYMPHOCYTES NFR BLD: 22.8 %
MCH RBC QN AUTO: 29.9 PG (ref 27–31.3)
MCHC RBC AUTO-ENTMCNC: 33.6 % (ref 33–37)
MCV RBC AUTO: 89.1 FL (ref 79.4–94.8)
MONOCYTES # BLD: 0.7 K/UL (ref 0.2–0.8)
MONOCYTES NFR BLD: 6.6 %
NEUTROPHILS # BLD: 7.6 K/UL (ref 1.4–6.5)
NEUTS SEG NFR BLD: 69.9 %
PLATELET # BLD AUTO: 168 K/UL (ref 130–400)
RBC # BLD AUTO: 4.21 M/UL (ref 4.2–5.4)
WBC # BLD AUTO: 10.8 K/UL (ref 4.8–10.8)

## 2023-08-08 PROCEDURE — 1220000000 HC SEMI PRIVATE OB R&B

## 2023-08-08 PROCEDURE — 6370000000 HC RX 637 (ALT 250 FOR IP): Performed by: OBSTETRICS & GYNECOLOGY

## 2023-08-08 PROCEDURE — 99024 POSTOP FOLLOW-UP VISIT: CPT | Performed by: OBSTETRICS & GYNECOLOGY

## 2023-08-08 PROCEDURE — 85025 COMPLETE CBC W/AUTO DIFF WBC: CPT

## 2023-08-08 RX ADMIN — ACETAMINOPHEN 1000 MG: 500 TABLET ORAL at 18:08

## 2023-08-08 RX ADMIN — ACETAMINOPHEN 1000 MG: 500 TABLET ORAL at 01:27

## 2023-08-08 RX ADMIN — ACETAMINOPHEN 1000 MG: 500 TABLET ORAL at 10:09

## 2023-08-08 RX ADMIN — IBUPROFEN 600 MG: 600 TABLET, FILM COATED ORAL at 10:09

## 2023-08-08 RX ADMIN — DOCUSATE SODIUM 100 MG: 100 CAPSULE, LIQUID FILLED ORAL at 10:09

## 2023-08-08 RX ADMIN — IBUPROFEN 600 MG: 600 TABLET, FILM COATED ORAL at 18:08

## 2023-08-08 RX ADMIN — IBUPROFEN 600 MG: 600 TABLET, FILM COATED ORAL at 01:26

## 2023-08-08 ASSESSMENT — PAIN SCALES - GENERAL: PAINLEVEL_OUTOF10: 0

## 2023-08-08 NOTE — PLAN OF CARE
Problem: Postpartum  Goal: Experiences normal postpartum course  Description:  Postpartum OB-Pregnancy care plan goal which identifies if the mother is experiencing a normal postpartum course  2023 by Trupti Dozier RN  Outcome: Progressing  2023 08 by Rima Huff RN  Outcome: Progressing  Goal: Appropriate maternal -  bonding  Description:  Postpartum OB-Pregnancy care plan goal which identifies if the mother and  are bonding appropriately  2023 by Trupti Dozier RN  Outcome: Progressing  2023 0845 by Rima Huff RN  Outcome: Progressing  Goal: Establishment of infant feeding pattern  Description:  Postpartum OB-Pregnancy care plan goal which identifies if the mother is establishing a feeding pattern with their   2023 by Trupti Dozier RN  Outcome: Progressing  2023 08 by Rima Huff RN  Outcome: Progressing  Goal: Incisions, wounds, or drain sites healing without S/S of infection  2023 by Trupti Dozier RN  Outcome: Progressing  2023 08 by Rima Huff RN  Outcome: Progressing     Problem: Pain  Goal: Verbalizes/displays adequate comfort level or baseline comfort level  2023 by Trupti Dozier RN  Outcome: Progressing  2023 0845 by Rima Huff RN  Outcome: Progressing     Problem: Infection - Adult  Goal: Absence of infection at discharge  2023 by Trupti Dozier RN  Outcome: Progressing  2023 0845 by Rima Huff RN  Outcome: Progressing  Goal: Absence of infection during hospitalization  2023 by Trupti Dozier RN  Outcome: Progressing  2023 0845 by Rima Huff RN  Outcome: Progressing  Goal: Absence of fever/infection during anticipated neutropenic period  2023 by Trupti Dozier RN  Outcome: Progressing  2023 08 by Rima Huff RN  Outcome: Progressing     Problem: Safety - Adult  Goal: Free from fall injury  2023 by Trupti Dozier RN  Outcome: Progressing  2023 0845 by Rima Huff RN  Outcome: Progressing     Problem: Discharge Planning  Goal: Discharge to home or other facility with appropriate resources  8/8/2023 1956 by Krishan Segura RN  Outcome: Progressing  8/8/2023 0845 by Rupert rPadhan RN  Outcome: Progressing

## 2023-08-08 NOTE — LACTATION NOTE
This note was copied from a baby's chart.  -initial lactation visit   -mom has been both breastfeeding and formula feeding  -counseled on risks of early supplementation to milk supply, importance of paced bottle feeding if offering supplement  -provided with written breastfeeding education materials and reviewed  -offered latch assistance, mother accepted  -baby latches to breast easily, difficulty eliciting suck reflex  -assessment of baby's oral anatomy performed  -recessed chin and thick upper labial frenulum noted  -requires lots of stimulation to palate to elicit suck reflex and uses \"chomping\" motion  -recommend OT, mother is agreeable, will obtain and place order  -after mutliple attempts, baby eventually latched to right breast in football hold  -showed how to use breast compression/stimulation techniques to encourage active sucking  -recommend frequent skin to skin and breastfeeding per hunger cues, calling for [facility] assistance as needed  -mom verbalized understanding

## 2023-08-09 VITALS
HEART RATE: 71 BPM | RESPIRATION RATE: 18 BRPM | SYSTOLIC BLOOD PRESSURE: 140 MMHG | DIASTOLIC BLOOD PRESSURE: 72 MMHG | TEMPERATURE: 97.7 F | OXYGEN SATURATION: 100 %

## 2023-08-09 PROCEDURE — 7200000001 HC VAGINAL DELIVERY

## 2023-08-09 PROCEDURE — 6370000000 HC RX 637 (ALT 250 FOR IP): Performed by: OBSTETRICS & GYNECOLOGY

## 2023-08-09 RX ORDER — IBUPROFEN 800 MG/1
800 TABLET ORAL 3 TIMES DAILY PRN
Qty: 90 TABLET | Refills: 0 | Status: SHIPPED | OUTPATIENT
Start: 2023-08-09

## 2023-08-09 RX ADMIN — ACETAMINOPHEN 1000 MG: 500 TABLET ORAL at 12:11

## 2023-08-09 RX ADMIN — IBUPROFEN 600 MG: 600 TABLET, FILM COATED ORAL at 00:36

## 2023-08-09 RX ADMIN — IBUPROFEN 600 MG: 600 TABLET, FILM COATED ORAL at 12:11

## 2023-08-09 RX ADMIN — ACETAMINOPHEN 1000 MG: 500 TABLET ORAL at 00:36

## 2023-08-09 ASSESSMENT — PAIN DESCRIPTION - DESCRIPTORS: DESCRIPTORS: CRAMPING

## 2023-08-09 ASSESSMENT — PAIN SCALES - GENERAL
PAINLEVEL_OUTOF10: 3
PAINLEVEL_OUTOF10: 1

## 2023-08-09 ASSESSMENT — PAIN DESCRIPTION - LOCATION: LOCATION: ABDOMEN

## 2023-08-09 NOTE — LACTATION NOTE
This note was copied from a baby's chart. Mother states she talked with pediatrician. States she is agreeable to donor milk. Infant awakened and placed on breast. Audible swallowing noted immediately and with every 2-3 sucks, good jaw excursion noted.  Per Physician order donor milk supplemented with feeding tube at breast.

## 2023-08-09 NOTE — FLOWSHEET NOTE
Patient provided with blood pressure cuff to go home with due to moderate range blood pressure readings during labor. Patient given preeclampsia paper and RN reviewed with patient. Patient verbalized understanding.

## 2024-06-13 NOTE — PROGRESS NOTES
Chief Complaint   Patient presents with    Right Knee - New Patient Visit     Bilat Knee pain xrays today    Left Knee - New Patient Visit     History of Present Illness:  The patient is a 23 y.o. female presenting to clinic with complaints of bilateral knee pain for a few weeks. Patient referred by Dr. Wen. She denies any discreet injury. Has some burning when going up and down stairs or getting up off the couch. Her right knee bothers her more than the left. She has a 10 month old and is breastfeeding. Has taken ibuprofen in past for pain relief.     Imaging:  Radiographs Knee: No acute fractures or dislocations.  No arthritic change and well-preserved joint space    Assessment:   Bilateral patellofemoral pain    Plan:  We reviewed the role of imaging, physical therapy, injections and the time frame to healing and correlation with outcome.  NSAID: Ibuprofen 800 mg 2-3x daily for one week.  GI side effects and medical risks discussed  Ice: 60 minutes on and off  Exercise home program: Medically directed knee therapy / Handout given  Bilateral knee braces  Follow-up 1 month     Physical Exam:  Well-nourished, well-developed. No acute distress. Alert and oriented x3. Responds appropriately to questioning. Good mood. Normal affect.  Physical Exam  Bilateral Knee:  ROM: Flexion to 120  Pain with patellar grind  Positive Zara´s test/PositiveApley Grind  Neurovascular exam normal distally  Palpable pedal pulse and good cap refill     Review of Systems:  GENERAL: Negative for malaise, significant weight loss, fever  MUSCULOSKELETAL: See HPI  NEURO:  Negative     Past Medical History:   Diagnosis Date    Other specified health status 09/26/2014    Known health problems: none    Personal history of other diseases of the nervous system and sense organs 09/26/2014    History of migraine       Medication Documentation Review Audit    **Prior to Admission medications have not yet been reviewed**         Not on  File    Social History     Socioeconomic History    Marital status: Single     Spouse name: Not on file    Number of children: Not on file    Years of education: Not on file    Highest education level: Not on file   Occupational History    Not on file   Tobacco Use    Smoking status: Not on file    Smokeless tobacco: Not on file   Substance and Sexual Activity    Alcohol use: Not on file    Drug use: Not on file    Sexual activity: Not on file   Other Topics Concern    Not on file   Social History Narrative    Not on file     Social Determinants of Health     Financial Resource Strain: Not on file   Food Insecurity: Not on file   Transportation Needs: Not on file   Physical Activity: Not on file   Stress: Not on file   Social Connections: Not on file   Intimate Partner Violence: Not on file   Housing Stability: Not on file       Past Surgical History:   Procedure Laterality Date    OTHER SURGICAL HISTORY  11/21/2014    Catheter Ablation Of Arrhythmogenic Bypass Tracts For Ventricular Tachycardia       No results found.       Scribe Attestation  By signing my name below, Katerin LEDEZMA Scribe   attest that this documentation has been prepared under the direction and in the presence of Jamie Ramirez MD.

## 2024-06-14 ENCOUNTER — OFFICE VISIT (OUTPATIENT)
Dept: ORTHOPEDIC SURGERY | Facility: CLINIC | Age: 24
End: 2024-06-14
Payer: COMMERCIAL

## 2024-06-14 ENCOUNTER — HOSPITAL ENCOUNTER (OUTPATIENT)
Dept: RADIOLOGY | Facility: CLINIC | Age: 24
Discharge: HOME | End: 2024-06-14
Payer: COMMERCIAL

## 2024-06-14 DIAGNOSIS — M22.2X2 PATELLOFEMORAL SYNDROME OF BOTH KNEES: ICD-10-CM

## 2024-06-14 DIAGNOSIS — M25.562 PAIN IN BOTH KNEES, UNSPECIFIED CHRONICITY: ICD-10-CM

## 2024-06-14 DIAGNOSIS — M22.2X1 PATELLOFEMORAL SYNDROME OF BOTH KNEES: ICD-10-CM

## 2024-06-14 DIAGNOSIS — M25.561 PAIN IN BOTH KNEES, UNSPECIFIED CHRONICITY: ICD-10-CM

## 2024-06-14 PROCEDURE — 99214 OFFICE O/P EST MOD 30 MIN: CPT | Performed by: ORTHOPAEDIC SURGERY

## 2024-06-14 PROCEDURE — 73564 X-RAY EXAM KNEE 4 OR MORE: CPT | Mod: 50

## 2024-06-14 RX ORDER — IBUPROFEN 800 MG/1
800 TABLET ORAL 3 TIMES DAILY
Qty: 90 TABLET | Refills: 0 | Status: SHIPPED | OUTPATIENT
Start: 2024-06-14 | End: 2024-07-14

## 2024-07-08 NOTE — PROGRESS NOTES
Chief Complaint   Patient presents with    Left Knee - Follow-up     Patellofemoral pain  Ibuprofen/Brace/HEP    Right Knee - Follow-up     Patellofemoral pain  Ibuprofen/Brace/HEP     History of Present Illness:  The patient is a 23 y.o. female presenting to clinic with complaints of bilateral knee pain for a few weeks. Patient referred by Dr. Wen. She denies any discreet injury. Has some burning when going up and down stairs or getting up off the couch. Her right knee bothers her more than the left. She has a 10 month old and is breastfeeding. Has taken ibuprofen in past for pain relief. She has not started the home exercise program.     Imaging:  Radiographs Knee: No acute fractures or dislocations.  No arthritic change and well-preserved joint space    Assessment:   Bilateral patellofemoral pain    Plan:  We reviewed the role of imaging, physical therapy, injections and the time frame to healing and correlation with outcome.  NSAID: Ibuprofen 800 mg 2-3x daily for one week.  GI side effects and medical risks discussed  Ice: 60 minutes on and off  Exercise home program: Medically directed knee therapy / Handout given  Bilateral knee braces  Referral to physical therapy at [facility]  Follow-up 6 weeks     Physical Exam:  Well-nourished, well-developed. No acute distress. Alert and oriented x3. Responds appropriately to questioning. Good mood. Normal affect.  Physical Exam  Bilateral Knee:  ROM: Flexion to 120  Pain with patellar grind  Positive Zara´s test/PositiveApley Grind  Neurovascular exam normal distally  Palpable pedal pulse and good cap refill     Review of Systems:  GENERAL: Negative for malaise, significant weight loss, fever  MUSCULOSKELETAL: See HPI  NEURO:  Negative     Past Medical History:   Diagnosis Date    Other specified health status 09/26/2014    Known health problems: none    Personal history of other diseases of the nervous system and sense organs 09/26/2014    History of migraine        Medication Documentation Review Audit       Reviewed by Rebekah Hanley MA (Medical Assistant) on 07/12/24 at 1124      Medication Order Taking? Sig Documenting Provider Last Dose Status   ibuprofen 800 mg tablet [id number]  Take 1 tablet (800 mg) by mouth 3 times a day. Doreen Carolina PA-C  Active                    No Known Allergies    Social History     Socioeconomic History    Marital status: Single     Spouse name: Not on file    Number of children: Not on file    Years of education: Not on file    Highest education level: Not on file   Occupational History    Not on file   Tobacco Use    Smoking status: Never    Smokeless tobacco: Never   Substance and Sexual Activity    Alcohol use: Not on file    Drug use: Not on file    Sexual activity: Not on file   Other Topics Concern    Not on file   Social History Narrative    Not on file     Social Determinants of Health     Financial Resource Strain: Not on file   Food Insecurity: Not on file   Transportation Needs: Not on file   Physical Activity: Not on file   Stress: Not on file   Social Connections: Not on file   Intimate Partner Violence: Not on file   Housing Stability: Not on file       Past Surgical History:   Procedure Laterality Date    OTHER SURGICAL HISTORY  11/21/2014    Catheter Ablation Of Arrhythmogenic Bypass Tracts For Ventricular Tachycardia       No results found.       Scribe Attestation  By signing my name below, IItzel Scribe   attest that this documentation has been prepared under the direction and in the presence of Jamie Ramirez MD.

## 2024-07-12 ENCOUNTER — OFFICE VISIT (OUTPATIENT)
Dept: ORTHOPEDIC SURGERY | Facility: CLINIC | Age: 24
End: 2024-07-12
Payer: COMMERCIAL

## 2024-07-12 DIAGNOSIS — M22.2X1 PATELLOFEMORAL SYNDROME OF BOTH KNEES: ICD-10-CM

## 2024-07-12 DIAGNOSIS — M22.2X2 PATELLOFEMORAL SYNDROME OF BOTH KNEES: ICD-10-CM

## 2024-07-12 PROCEDURE — 99213 OFFICE O/P EST LOW 20 MIN: CPT | Performed by: ORTHOPAEDIC SURGERY

## 2024-07-12 PROCEDURE — 99214 OFFICE O/P EST MOD 30 MIN: CPT | Performed by: ORTHOPAEDIC SURGERY

## 2024-08-08 NOTE — PROGRESS NOTES
Physical Therapy Evaluation    Patient Name: Emy Dela Cruz  MRN: [id number]  Time Calculation  Start Time: 1243  Stop Time: 1325  Time Calculation (min): 42 min  PT Evaluation Time Entry  PT Evaluation (Low) Time Entry: 22  PT Therapeutic Procedures Time Entry  Neuromuscular Re-Education Time Entry: 2  Therapeutic Exercise Time Entry: 3  Therapeutic Activity Time Entry: 15                   Current Problem  1. Patellofemoral syndrome of both knees  Referral to Physical Therapy    Follow Up In Physical Therapy        Insurance    Insurance reviewed   Visit number: 1  Approved number of visits: BOA      [employer] BOA COPAY 0 DED 0 COVERAGE 100  OOP 0 AUTH IS REQ AFTER IE [id number]   Subjective   General:  Patient is a 23 year old  presenting with complaints of bilateral knee pain. She has a 1 year old and is currently nursing. 2-3 months ago, she started to notice some burning pain discomfort through her knees when standing up from a chair or going up stairs. The discomfort is through the front of her knee. Both knees bother her. She has been wearing the braces provided to her by her referring physician She denies any previous issues with her knees. She reports that she mostly works from home. She is primarily sitting for work. She reports some clicking. She denies any issues with giving way. Going up stairs is harder than going downstairs.  She has been primarily icing her knees. She also reports a few year history of lower back pain. There is not one time of day where the knees seem to feel better. The patient's goals for therapy are find a way to reduce the pain.  Patient reports that she has a history tachycardia. She had an ablation for this. She reports a history of asthma. She is not currently exercising. She reports that she is very busy with work and with her 1 year old son.   Precautions:  None  Pain:  7-8/10  Reviewed medical screening form with pt and medical screening assessed    Imaging:   Narrative &  Impression   Interpreted By:  Jamie Agarwal,   STUDY:  XR KNEE 4+ VIEWS BILATERAL;  ;  6/14/2024 10:04 am      INDICATION:  Signs/Symptoms:pain.      ACCESSION NUMBER(S):  [id number]      ORDERING CLINICIAN:  JAMIE AGARWAL      FINDINGS:  No acute fracture or dislocations of the knee. Osseous lesion in the  right proximal tibia. To be benign bone lesion with well  circumscribed geographic borders. No reactive change. No arthritic  change and well-preserved joint space. Concentric patellofemoral  joint. No effusion or soft tissue swelling.     Objective   Pulse at beginning of session: 95bpm  Observation: Stands in bilateral knee hyperextension    Beighton Score: 6/9  Sweep Test for Edema: R-0   L-  0      Functional Mobility  Gait: Mild L antalgia  SL Balance: R- >10 seconds L- 10 seconds  Stair Navigation: subjective discomfort and impaired upward power generation with ascending bilaterally  Squat:marked forward trunk lean, subjective bilateral anterior knee pain           Knee AROM (* indicates pain)  Flexion L 140 degrees ; R 140 degrees  degrees  Extension L  10 degrees hyperextension; R  10 degrees hyperextension degrees        Knee MMT (* indicates pain)  Flexion L  4+/5 ; R  4+/5  Extension L  5/5 ; R  5/5  SLR to fatigue R-12 L-17 (subjective anterior knee discomfort)        Hip MMT and Muscular Performance (* indicates pain)  ABD: L   4+/5; R  4+/5  IR: L- 5/5 R- 5/5  ER: L- 5/5 R- 5/5  Hip bridge: 50% full range  SL Hip bridge: 25% full range bilaterally    Accessory Mobility  Patellofemoral Joint: R-5/6 mobility in all directions L-5/6 mobility in all directions bilaterally      Palpation:TTP through bilateral peripatellar regions, bilateral patellar tendon regions      Valgus Stress Test at 0 degrees: R- Neg L-  Neg  Valgus Stress Test at 30 degrees: R- Neg L-  Neg  Varus Stress Test at 0 degrees: R- Neg L-  Neg  Varus Stress Test at 30 degrees R- Neg L-  Neg  Lachman's Test: R- Neg  L-  Neg  McMurrays:  "R- Neg  L-  Neg  Knee flexion with OP: R-Neg  L-  Neg  Knee Extension with OP: R-Neg  L-  Neg        Outcome Measures:  Other Measures  Lower Extremity Funtional Score (LEFS): 61     Treatment:   -Patient education regarding recovery timeline, rehabilitation process and rehabilitation timeline, home exercise program demonstration and construction, review of precautions, and long term strategies to maximize functional capacity and functional independence 15 minutes  -SLR 1x15  -SL balance with partial knee flexion 2x30\"    EDUCATION/HEP:  Access Code: [email or web address]  URL: [email or web address]  Date: 08/09/2024  Prepared by: Jian Morse    Exercises  - Active Straight Leg Raise with Quad Set  - 1 x daily - 7 x weekly - 2 sets - 10 reps  - Single Leg Stance  - 1 x daily - 7 x weekly - 2 reps - 30 hold  Assessment:  Patient is a 23 year old female  presenting with complaints of bilateral chronic knee pain. Patient presents with the following primary physical and functional impairments and limitations:  difficulty with stairs, generalized hypermobility, impaired bilateral quadriceps eccentric strength, mild impairments in bilateral knee flexion strength, patellofemoral joint hypomobility, impaired posterior chain hip strength, impaired squatting mechanics and discomfort with squatting and impaired functional weight bearing activity capacity secondary to the abovementioned impairments.  Patient is displaying good prognosis for physical therapy care based upon subjective and objective assessment. Patient will benefit from skilled intervention to address the above mentioned impairments to maximize functional capacity and quality of life. Patient appeared to understand all education provided. Patient is displaying good prognosis for physical therapy care based upon subjective and objective assessment.       Physical Therapy Problem List  Pain with stairs  2.   Pain with standing up from chair     "     Clinical Presentation: Stable     Level of Complexity: Low   Goals:  Pt will be independent with advanced HEP   Pt will display 5/5 strength of affected knee extension and flexion strength, indicating improved capacity for functional loading of affected knee.   Pt. Will be able to squat to parallel with minimal concordant knee pain bilaterally  Pt. Will be able to complete single leg hip bridge to full hip extension ROM bilaterally  Pt will negotiate flight of stairs mod I reciprocally without increased knee pain  Pt will ambulate community distances independent over varying surfaces/inclines without increased affected knee pain                    Pt will improve LEFS score by at least 9 points (MCID) to indicate significant improvement in functional abilities.   Pt will report return to rising from a chair with minimal pain/limitation, indicating improved functional capacity.      Plan  1x/week for  8 visits     Skilled therapeutic intervention to address the above mentioned physical and functional impairments and limitations including, but not limited to: patient education, therapeutic exercise, therapeutic activity, manual therapy, body mechanics training, dry needling, blood flow restriction training, instrumented soft tissue mobilization, manual soft tissue mobilization, gait retraining, biofeedback, cryotherapy, electrical stimulation, home program development, hot pack, taping, neuromuscular re-education, self-care/home management, and vasopneumatic compression.

## 2024-08-09 ENCOUNTER — EVALUATION (OUTPATIENT)
Dept: PHYSICAL THERAPY | Facility: CLINIC | Age: 24
End: 2024-08-09
Payer: COMMERCIAL

## 2024-08-09 DIAGNOSIS — M22.2X1 PATELLOFEMORAL SYNDROME OF BOTH KNEES: ICD-10-CM

## 2024-08-09 DIAGNOSIS — M22.2X2 PATELLOFEMORAL SYNDROME OF BOTH KNEES: ICD-10-CM

## 2024-08-09 PROCEDURE — 97530 THERAPEUTIC ACTIVITIES: CPT | Mod: GP | Performed by: PHYSICAL THERAPIST

## 2024-08-09 PROCEDURE — 97161 PT EVAL LOW COMPLEX 20 MIN: CPT | Mod: GP | Performed by: PHYSICAL THERAPIST

## 2024-08-09 ASSESSMENT — ENCOUNTER SYMPTOMS
OCCASIONAL FEELINGS OF UNSTEADINESS: 1
DEPRESSION: 1
LOSS OF SENSATION IN FEET: 0

## 2024-08-09 ASSESSMENT — PATIENT HEALTH QUESTIONNAIRE - PHQ9
10. IF YOU CHECKED OFF ANY PROBLEMS, HOW DIFFICULT HAVE THESE PROBLEMS MADE IT FOR YOU TO DO YOUR WORK, TAKE CARE OF THINGS AT HOME, OR GET ALONG WITH OTHER PEOPLE: SOMEWHAT DIFFICULT
1. LITTLE INTEREST OR PLEASURE IN DOING THINGS: SEVERAL DAYS
2. FEELING DOWN, DEPRESSED OR HOPELESS: NOT AT ALL
SUM OF ALL RESPONSES TO PHQ9 QUESTIONS 1 AND 2: 1

## 2024-08-09 ASSESSMENT — PAIN - FUNCTIONAL ASSESSMENT: PAIN_FUNCTIONAL_ASSESSMENT: 0-10

## 2024-08-09 ASSESSMENT — PAIN SCALES - GENERAL: PAINLEVEL_OUTOF10: 7

## 2024-08-16 ENCOUNTER — TREATMENT (OUTPATIENT)
Dept: PHYSICAL THERAPY | Facility: CLINIC | Age: 24
End: 2024-08-16
Payer: COMMERCIAL

## 2024-08-16 DIAGNOSIS — M22.2X2 PATELLOFEMORAL SYNDROME OF BOTH KNEES: ICD-10-CM

## 2024-08-16 DIAGNOSIS — M22.2X1 PATELLOFEMORAL SYNDROME OF BOTH KNEES: ICD-10-CM

## 2024-08-16 PROCEDURE — 97110 THERAPEUTIC EXERCISES: CPT | Mod: GP

## 2024-08-16 NOTE — PROGRESS NOTES
Physical Therapy Treatment    Patient Name: Emy Dela Cruz  MRN: [id number]  Today's Date: 8/16/2024  Time Calculation  Start Time: 0800  Stop Time: 0829  Time Calculation (min): 29 min     PT Therapeutic Procedures Time Entry  Therapeutic Exercise Time Entry: 29                   Current Problem  1. Patellofemoral syndrome of both knees  Follow Up In Physical Therapy            Subjective     Insurance reviewed   Visit number: 2  Approved number of visits: BOA       [employer] BOA COPAY 0 DED 0 COVERAGE 100  OOP 0 AUTH IS REQ AFTER IE [id number]   Date range: 8/13/24 -10/11/24  Fall Risk:         General   Pt states she was sore following the first visit. She states the squatting flared up her knee pain. Pain rated 0/10 today.    Precautions     Vital Signs       Pain   0/10    Ortho  Objective           Treatments:  SLR Flex 2x10  Bridge x10 P!  SLR abd 2x10 ea  Prone SLR x10ea  High STS x10  LAQ 2# 2x10  Seated Hamstring stretch 2x30s  ITB Stretch 2x30s BL        OP EDUCATION:  Added to HEP   S/L hip abd   Prone hip ext  ITB stretch    Educated on normal muscle soreness/fatigue    Assessment:   Pt arrived 15 minutes late resulting in shortened treatment session. Pt tolerated treatment session well today. Added several hip strengthening exercises with fatigue noted. Initiated high STS for functional strengthening with no pain and good control/form. Pt had some minor discomfort with bridges today around the lateral aspect of her L patella. Slight lateral tracking noted in L patella with LAQ. Initiated IT Band stretch with pt feeling relief following completion. Pt will continue to benefit from skilled therapy in order to improve functional mobility and reach her goals.    Plan:   Continue to progress as tolerated with focus on Hip/quads strengthening

## 2024-08-21 ENCOUNTER — TREATMENT (OUTPATIENT)
Dept: PHYSICAL THERAPY | Facility: CLINIC | Age: 24
End: 2024-08-21
Payer: COMMERCIAL

## 2024-08-21 DIAGNOSIS — M22.2X1 PATELLOFEMORAL SYNDROME OF BOTH KNEES: ICD-10-CM

## 2024-08-21 DIAGNOSIS — M22.2X2 PATELLOFEMORAL SYNDROME OF BOTH KNEES: ICD-10-CM

## 2024-08-21 PROCEDURE — 97110 THERAPEUTIC EXERCISES: CPT | Mod: GP | Performed by: PHYSICAL THERAPIST

## 2024-08-21 NOTE — PROGRESS NOTES
"Physical Therapy Treatment    Patient Name: Emy Dela Cruz  MRN: [id number]  Today's Date: 8/21/2024  Time Calculation  Start Time: 1603  Stop Time: 1644  Time Calculation (min): 41 min     PT Therapeutic Procedures Time Entry  Neuromuscular Re-Education Time Entry: 3  Therapeutic Exercise Time Entry: 38                   Current Problem  1. Patellofemoral syndrome of both knees  Follow Up In Physical Therapy              Subjective     Insurance reviewed   Visit number: 3  Approved number of visits: BOA       [employer] BOA COPAY 0 DED 0 COVERAGE 100  OOP 0 AUTH IS REQ AFTER IE [id number]/ALL     [employer] APPROVED  8 PT  VISITS  8-13-24 THRU 10-11-24  AUTH# [id number]  [id number]/ALL  Date range: 8/13/24 -10/11/24      General   Pt states that her knees have been doing pretty good. Patient continues to report some discomfort with squatting and stairs. Walking is fine.         Pain   0/10      Objective   SLR: Patient displayed improved capacity for full quadriceps contraction and knee extension with repetition bilaterally        Treatments:  -Treadmill walking  2.0 mph 5 minutes  -SLR  2x10-12 bilaterally  -SAQ 2x10-12 bilaterally 2lbs  -Hip bridge with abduction band 2x10  -Sidelying clamshells 2x10 bilaterally YTB  -Knee extension isometrics at 75 degrees flexion 2x5-5\" holds bilateraly  -Seated hamstring curls 2x10-12 GTB  -Tandem Stance 3x30\" bilaterally with EC        OP EDUCATION:  Added to HEP   S/L hip abd   Prone hip ext  ITB stretch    Educated on normal muscle soreness/fatigue    Assessment:   Patient tolerated treatment well. Patient displayed improved quadriceps contraction and capacity to complete repeated straight leg raise without quadriceps lag compared to initial evaluation. Patient was challenged with this, but was able to tolerate. Patient tolerated progression of bilateral quadriceps neuromuscular control and strengthening well today. Patient also tolerated progression of proximal hip strengthening " well today as well. Patient was challenged with narrow base of support balance with eyes closed, but this improved with treatment. Primary focuses of treatment moving forward should include: maximizing dynamic stability of bilateral knee complexes. Patient appeared to understand all education provided. Will continue to benefit from skilled intervention to address the above mentioned impairments and limitations.     Physical Therapy Problem List  Pain with stairs  2.   Pain with standing up from chair    Plan:   Progressive quadriceps/hamstring and posterior/lateral hip strength to improve dynamic stability of bilateral knee complexes

## 2024-08-28 NOTE — PROGRESS NOTES
Chief Complaint   Patient presents with    Left Knee - Follow-up     Patellofemoral pain  Ibuprofen/Brace/HEP    Right Knee - Follow-up     Patellofemoral pain  Ibuprofen/Brace/HEP      History of Present Illness:  Patient reports the knees are feeling much better since starting physical therapy.  She is back to doing more of her normal day-to-day activities without issues. She has some occasional aching pain.     The patient is a 23 y.o. female presenting to clinic with complaints of bilateral knee pain for a few weeks. Patient referred by Dr. Wen. She denies any discreet injury. Has some burning when going up and down stairs or getting up off the couch. Her right knee bothers her more than the left. She has a 10 month old and is breastfeeding. Has taken ibuprofen in past for pain relief. She has not started the home exercise program.     Imaging:  Radiographs Knee: No acute fractures or dislocations.  No arthritic change and well-preserved joint space    Assessment:   Bilateral patellofemoral pain    Plan:  We reviewed the role of imaging, physical therapy, injections and the time frame to healing and correlation with outcome.  NSAID: Ibuprofen.  GI side effects and medical risks discussed  Ice: 60 minutes on and off  Exercise home program: Medically directed knee therapy / Handout given  Continue PT  Follow-up as needed     Physical Exam:  Well-nourished, well-developed. No acute distress. Alert and oriented x3. Responds appropriately to questioning. Good mood. Normal affect.  Physical Exam  Bilateral Knee:  ROM: Flexion to 120  Pain with patellar grind  Positive Zara´s test/PositiveApley Grind  Neurovascular exam normal distally  Palpable pedal pulse and good cap refill     Review of Systems:  GENERAL: Negative for malaise, significant weight loss, fever  MUSCULOSKELETAL: See HPI  NEURO:  Negative     Past Medical History:   Diagnosis Date    Other specified health status 09/26/2014    Known health  problems: none    Personal history of other diseases of the nervous system and sense organs 09/26/2014    History of migraine       Medication Documentation Review Audit       Reviewed by Rebekah Hanley MA (Medical Assistant) on 07/12/24 at 1124      Medication Order Taking? Sig Documenting Provider Last Dose Status   ibuprofen 800 mg tablet [id number]  Take 1 tablet (800 mg) by mouth 3 times a day. Doreen Carolina PA-C  Active                    No Known Allergies    Social History     Socioeconomic History    Marital status: Single     Spouse name: Not on file    Number of children: Not on file    Years of education: Not on file    Highest education level: Not on file   Occupational History    Not on file   Tobacco Use    Smoking status: Never    Smokeless tobacco: Never   Substance and Sexual Activity    Alcohol use: Not on file    Drug use: Not on file    Sexual activity: Not on file   Other Topics Concern    Not on file   Social History Narrative    Not on file     Social Determinants of Health     Financial Resource Strain: Not on file   Food Insecurity: Not on file   Transportation Needs: Not on file   Physical Activity: Not on file   Stress: Not on file   Social Connections: Not on file   Intimate Partner Violence: Not on file   Housing Stability: Not on file       Past Surgical History:   Procedure Laterality Date    OTHER SURGICAL HISTORY  11/21/2014    Catheter Ablation Of Arrhythmogenic Bypass Tracts For Ventricular Tachycardia       No results found.       Scribe Attestation  By signing my name below, Katerin LEDEZMA Scribe   attest that this documentation has been prepared under the direction and in the presence of Jamie Ramirez MD.

## 2024-08-29 ENCOUNTER — APPOINTMENT (OUTPATIENT)
Dept: ORTHOPEDIC SURGERY | Facility: CLINIC | Age: 24
End: 2024-08-29
Payer: COMMERCIAL

## 2024-08-29 DIAGNOSIS — M22.2X1 PATELLOFEMORAL SYNDROME OF BOTH KNEES: Primary | ICD-10-CM

## 2024-08-29 DIAGNOSIS — M22.2X2 PATELLOFEMORAL SYNDROME OF BOTH KNEES: Primary | ICD-10-CM

## 2024-08-29 PROCEDURE — 99213 OFFICE O/P EST LOW 20 MIN: CPT | Performed by: ORTHOPAEDIC SURGERY

## 2024-08-30 ENCOUNTER — TREATMENT (OUTPATIENT)
Dept: PHYSICAL THERAPY | Facility: CLINIC | Age: 24
End: 2024-08-30
Payer: COMMERCIAL

## 2024-08-30 DIAGNOSIS — M22.2X1 PATELLOFEMORAL SYNDROME OF BOTH KNEES: ICD-10-CM

## 2024-08-30 DIAGNOSIS — M22.2X2 PATELLOFEMORAL SYNDROME OF BOTH KNEES: ICD-10-CM

## 2024-08-30 PROCEDURE — 97110 THERAPEUTIC EXERCISES: CPT | Mod: GP

## 2024-08-30 NOTE — PROGRESS NOTES
"Physical Therapy Treatment    Patient Name: Emy Dela Cruz  MRN: [id number]  Today's Date: 8/30/2024  Time Calculation  Start Time: 1133  Stop Time: 1211  Time Calculation (min): 38 min     PT Therapeutic Procedures Time Entry  Therapeutic Exercise Time Entry: 38                   Current Problem  1. Patellofemoral syndrome of both knees  Follow Up In Physical Therapy                Subjective     Insurance reviewed   Visit number: 4  Approved number of visits: BOA       [employer] BOA COPAY 0 DED 0 COVERAGE 100  OOP 0 AUTH IS REQ AFTER IE [id number]     [employer] APPROVED  8 PT  VISITS  8-13-24 THRU 10-11-24  AUTH# [id number]  [id number]  Date range: 8/13/24 -10/11/24      General   Pt states she has been doing pretty well and the exercises are definitely helping. She states she saw Dr. Ramirez and will continue to be seen on an as needed basis. Pt states she is going to need to make today her last day as her insurance will be discontinued as of tomorrow.        Pain   0/10      Objective   SLR: Patient displayed improved capacity for full quadriceps contraction and knee extension with repetition bilaterally      SLS >10s BL     Knee MMT (* indicates pain)  Flexion L  5-/5 ; R  4+/5  Extension L  5/5 ; R  5/5  SLR to fatigue 20 BL no pain           Hip MMT and Muscular Performance (* indicates pain)  ABD: L   4+/5; R  4+/5  IR: L- 5/5 R- 5/5  ER: L- 5/5 R- 5/5  Hip bridge: 75% full range  SL Hip bridge: 50% full range bilaterally        Palpation:TTP through bilateral peripatellar regions, bilateral patellar tendon regions              Outcome Measures:  Other Measures  Lower Extremity Funtional Score (LEFS): 76    Treatments:  -SAUMYA 5 minutes  Goal review  -SLR  2x10-12 bilaterally  -Hip bridge with abduction band 2x10  -Sidelying clamshells 2x10 bilaterally YTB  -Seated hamstring curls 2x10-12 GTB  -Tandem Stance 3x30\" bilaterally with EC  Standing 2-way hip YTB x10 ea        OP EDUCATION:  Added to HEP  Access " Code: [email or web address]  URL: [email or web address]  Date: 08/30/2024  Prepared by: Daisy Tenorio    Exercises  - Clamshell with Resistance  - 1 x daily - 3-4 x weekly - 3 sets - 10 reps  - Sidelying Hip Abduction  - 1 x daily - 3-4 x weekly - 3 sets - 10 reps  - Supine Active Straight Leg Raise  - 1 x daily - 3-4 x weekly - 3 sets - 10 reps  - Seated Hamstring Curl with Anchored Resistance  - 1 x daily - 3-4 x weekly - 3 sets - 10 reps  - Standing Tandem Balance with Counter Support  - 1 x daily - 7 x weekly - 3 sets - 10s hold  - Bridge with Hip Abduction and Resistance  - 1 x daily - 3-4 x weekly - 2 sets - 10 reps  - Standing 3-Way Leg Reach with Resistance at Ankles and Counter Support  - 1 x daily - 3-4 x weekly - 1 sets - 10 reps    Educated on normal muscle soreness/fatigue    Assessment:   At this time pt has made good progress on overall strength and mobility. She continues to have some pain with squatting and stairs but this is much improved. Pt Able to improve single leg bridge but this continues to be challenging. Pt still most limited with hip and knee strength but this progressed since starting physical therapy. Pt educated on continued benefits of HEP and physical therapy in the future should pain persist. Pt verbalized understanding. HEP reviewed and updated within pt tolerance. Pt will be placed on hold today d/t financial constraint/insurance.        Plan:   Pt to be placed on hold with HEP. Encouraged to call with questions or concerns.    Goals:  Pt will be independent with advanced HEP MET  Pt will display 5/5 strength of affected knee extension and flexion strength, indicating improved capacity for functional loading of affected knee. Partially met  Pt. Will be able to squat to parallel with minimal concordant knee pain bilaterally progress made  Pt. Will be able to complete single leg hip bridge to full hip extension ROM bilaterally partially met  Pt will negotiate flight of  stairs mod I reciprocally without increased knee pain met  Pt will ambulate community distances independent over varying surfaces/inclines without increased affected knee pain   met                 Pt will improve LEFS score by at least 9 points (MCID) to indicate significant improvement in functional abilities. met   Pt will report return to rising from a chair with minimal pain/limitation, indicating improved functional capacity. Partially met

## 2024-09-06 ENCOUNTER — APPOINTMENT (OUTPATIENT)
Dept: PHYSICAL THERAPY | Facility: CLINIC | Age: 24
End: 2024-09-06
Payer: COMMERCIAL

## 2025-06-16 ENCOUNTER — TRANSCRIBE ORDERS (OUTPATIENT)
Dept: ADMINISTRATIVE | Age: 25
End: 2025-06-16

## 2025-06-16 DIAGNOSIS — E04.9 ENLARGEMENT OF THYROID: Primary | ICD-10-CM

## 2025-06-24 ENCOUNTER — NUTRITION (OUTPATIENT)
Dept: NUTRITION | Facility: CLINIC | Age: 25
End: 2025-06-24
Payer: COMMERCIAL

## 2025-06-24 VITALS — HEIGHT: 64 IN | WEIGHT: 205 LBS | BODY MASS INDEX: 35 KG/M2

## 2025-06-24 DIAGNOSIS — E88.819 INSULIN RESISTANCE, UNSPECIFIED: ICD-10-CM

## 2025-06-24 PROCEDURE — 97802 MEDICAL NUTRITION INDIV IN: CPT

## 2025-06-24 NOTE — PROGRESS NOTES
"Nutrition Assessment     Reason for Visit:  Emy Dela Cruz is a 24 y.o. female who presents for nutrition counseling seeking weight management.      Anthropometrics:    Anthropometrics  Height: 162.6 cm (5' 4\")  Weight: 93 kg (205 lb)  BMI (Calculated): 35.17    UBW: 135-140lbs     Meds: Mounjaro pending due to insurance    Food And Nutrient Intake:  Food and Nutrient History  Food and Nutrient History: Pt presents for nutrition counseling seeking weight management.  Her BMI indicates class II obesity.  Pt has a hx of gestational DM but is ot currently diabetic.  She would like to lose weight to prevent these types of health problems.  Her physician has ordered a GLP-1 - insurance approval is pending.  Pt states that she has been gaining weight since the pandemic.  Diet recall reveals that she generally eats 2 meals per day.  She eats fast food for lunch 5 out of 7 days.  She often cooks dinner which is often rice and beans and she is of [place] heritage.  Suggest pt follow the Healthy Plate model and limit fast food and calories from [employer].  Encouraged pt to occasionally track her calorie intakes on an stevie- aiming for 2826-0705 kcals/day.  Also, discussed the impacts that GLP-1 medication may have on nutrition.     Food Intake  Meal 1: breakfast; skips  Meal 2: lunch: [employer] or [employer] x 5  Snacks : sweets; chips  Meal 3: dinner: rice and beans, meats, green salads  Fluid Intake: water, less soda- Coke x2  Pattern of Alcohol Consumption: none- occ    Food Preparation  Cooking: Patient  Dining Out: More than 3 times a week       Bioactive Substance Intake  Pattern of Alcohol Consumption: none- occ        Food Supplement Intake  Vitamin Intake: Multivitamin       Physical Activities:  Physical Activity  Type of Physical Activity: minimal; walks sometimes     Nutrition Focused Physical Exam:  Deferred- no malnutrition suspected.       Energy Needs  Calculated Energy Needs Using Equations  Height: 162.6 cm (5' " "4\")  Weight Used for Equation Calculations: 93 kg (205 lb 0.4 oz)  Carlie- [place] Equation (Overweight or Obese Patients): 1660  Activity Factor: 1.3  Total Energy Needs: 2158  Estimated Energy Needs  Total Energy Estimated Needs in 24 hours (kCal): 1658 kCal  Method for Estimating Needs: MSJ x 1.3-500 kcals    Nutrition Diagnosis   Malnutrition Diagnosis  Patient has Malnutrition Diagnosis: No  Nutrition Diagnosis  Patient has Nutrition Diagnosis: Yes  Diagnosis Status (1): New  Nutrition Diagnosis 1: Food and nutrition related knowledge deficit  Related to (1): limited or lack of prior nutrition-related education  As Evidenced by (1): per pt report looking for guidance on healthy eating patterns, intake of unbalanced meals and snacks per diet recall  Additional Nutrition Diagnosis: Diagnosis 2  Diagnosis Status (2): New  Nutrition Diagnosis 2: Excessive carbohydrate intake  Related to (2): weight gain and obesity  As Evidenced by (2): diet recall    Nutrition Interventions/Recommendations   Nutrition Prescription  Individualized Nutrition Prescription Provided for : Oral nutrition  Individualized Nutrition Prescription Provided for : Calorie deficit, High protein snacks  Individualized Nutrition Prescription Provided for : Calorie deficit, High protein snacks  Food and Nutrition Delivery  Meals & Snacks: General Healthful Diet, Energy-modified diet  Goals: 4623-2700 kcals/day; Healthy Plate model  Nutrition Education  Nutrition Education Content: Content related nutrition education, Education on nutrition's influence on health, Physical activity guidance  Goals: Instructed on counting macronutrient intake, proper portion sizes, and general healthful nutrition. Instructed on benefits of wt loss with diabetes and heart health. Discussed mindful eating, slow gradual wt loss and goals beyond wt. Instructed pt to not skip meals - discussed this may lead to over eating later or snacking more than planned. Instructed " on importance of physical activity for wt loss and maintenance of wt loss. Both verbal and written education provided in the one-on-one setting. Pt verbalized understanding of information provided. All questions answered to pt satisfaction throughout visit  Nutrition Counseling  Theoretical Basis/Approach: Nutrition counseling based on transtheoretical model stages of change approach  Nutrition Counseling Strategies : Nutrition counseling based on self-monitoring strategy, Nutrition counseling based on problem solving strategy        Patient Instructions   Follow the Healthy Plate Method at meals- see handout.  This will help to increase your vegetable intake and decrease portion sizes of carbohydrates.   - Check out [employer] or [email or web address] for Mediterranean meal plans and recipes ideas.    When eating starchy foods, try to eat whole grains like potato with the skin, whole grain breads and cereals, brown rices and pastas.  Limit portions per the Healthy Plate Method.    Include protein with all meals and snacks.  Lean protein are better for cholesterol levels such as chicken(no skin), fish (baked or broiled or grilled), low-fat dairy, eggs, and peanut butter or nuts.    - For high protein snack ideas check out: https://[email or web address]/article/932340/10-high-protein-snacks-that-keep-you-feeling-full-longer/  - Protein drinks between meals such as Premier Protein, Muscle Milk or Fairlife can help curb appetite.  4.   Aim to lose 1# per week to reach your goal weight.      - Consider tracking your calorie intake on an stevie such as [employer] or LoseIt to track your calories.  '   - Track macronutrients and aim for 30% of calories from protein(90g), 40% of calories from carbohydrate, and 30% from fat.    - see sample meal plans for ideas.   5.   Increase fiber to 25-35g per day to help keep you askew and lower cholesterol levels.  You may consider a fiber supplement such as Benefiber or Metamucil everyday.     6.   Aim to drink 64 oz of water daily  7.   Aim for 30 minutes of exercise on most days.         Nutrition Monitoring and Evaluation   Food and Nutrient Intake  Monitoring and Evaluation Plan: Energy intake, Meal/snack pattern, Fiber intake, Carbohydrate intake, Amount of food, Fluid intake, Protein intake  Criteria: 5565-4573 kcal/day  Criteria: aim for at least 64 oz of water daily  Criteria: Aim for 3 meals/day with 1-2 snack  Meal/Snack Pattern: Food variety, Estimated meal and snack pattern  Criteria: Follow plate method when planning meals (1/2 plate non-starchy vegetables, 1/4 plate lean protein, 1/4 plate carbohydrates).  Criteria: Choose lean protein sources including chicken, turkey, seafood, and eggs. Be sure to include protein with each meal and snack  Criteria: Limit added sugar to less than 25 g per day  Criteria: Increase fiber from fruits, vegetables, whole grains, and beans/lentils  Additional Plan: Provided pt with the following handouts: wt loss tips, 1500 calorie 5 day meal plan, choose your foods list, exercise,  plate method  Knowledge Belief Attitude Determination   Monitoring and Evaluation Plan: Physical activity  Criteria: Encouraged regular physical activity including strength training as medically appropriate per AHA guidelines  Anthropometric measurements  Monitoring and Evaluation Plan: Weight change  Criteria: 1-2 lb wt loss per week  Biochemical Data, Medical Tests and Procedures  Monitoring and Evaluation Plan: Glucose/endocrine profile  Glucose/Endocrine Profile: Hemoglobin A1c (HgbA1c)  Criteria: <5.7%      Readiness to Change : Good  Level of Understanding : Good  Anticipated Compliant : Good

## 2025-06-24 NOTE — PATIENT INSTRUCTIONS
Follow the Healthy Plate Method at meals- see handout.  This will help to increase your vegetable intake and decrease portion sizes of carbohydrates.   - Check out [email or web address] or [email or web address] for Mediterranean meal plans and recipes ideas.    When eating starchy foods, try to eat whole grains like potato with the skin, whole grain breads and cereals, brown rices and pastas.  Limit portions per the Healthy Plate Method.    Include protein with all meals and snacks.  Lean protein are better for cholesterol levels such as chicken(no skin), fish (baked or broiled or grilled), low-fat dairy, eggs, and peanut butter or nuts.    - For high protein snack ideas check out: https://[email or web address].com/article/721960/10-high-protein-snacks-that-keep-you-feeling-full-longer/  - Protein drinks between meals such as Premier Protein, Muscle Milk or Fairlife can help curb appetite.  4.   Aim to lose 1# per week to reach your goal weight.      - Consider tracking your calorie intake on an stevie such as [employer] or HealthyOut to track your calories.  '   - Track macronutrients and aim for 30% of calories from protein(90g), 40% of calories from carbohydrate, and 30% from fat.    - see sample meal plans for ideas.   5.   Increase fiber to 25-35g per day to help keep you askew and lower cholesterol levels.  You may consider a fiber supplement such as Benefiber or Metamucil everyday.    6.   Aim to drink 64 oz of water daily  7.   Aim for 30 minutes of exercise on most days.

## 2025-06-26 ENCOUNTER — HOSPITAL ENCOUNTER (OUTPATIENT)
Dept: ULTRASOUND IMAGING | Age: 25
Discharge: HOME | End: 2025-06-28
Payer: COMMERCIAL

## 2025-06-26 DIAGNOSIS — E04.9 ENLARGEMENT OF THYROID: ICD-10-CM

## 2025-06-26 PROCEDURE — 76536 US EXAM OF HEAD AND NECK: CPT

## 2025-07-31 ENCOUNTER — OFFICE VISIT (OUTPATIENT)
Age: 25
End: 2025-07-31
Payer: COMMERCIAL

## 2025-07-31 VITALS
SYSTOLIC BLOOD PRESSURE: 101 MMHG | OXYGEN SATURATION: 97 % | HEART RATE: 71 BPM | DIASTOLIC BLOOD PRESSURE: 69 MMHG | HEIGHT: 64 IN | BODY MASS INDEX: 34.83 KG/M2 | WEIGHT: 204 LBS

## 2025-07-31 DIAGNOSIS — E07.9 THYROID CONDITION: Primary | ICD-10-CM

## 2025-07-31 DIAGNOSIS — E88.819 INSULIN RESISTANCE: ICD-10-CM

## 2025-07-31 PROCEDURE — 99243 OFF/OP CNSLTJ NEW/EST LOW 30: CPT | Performed by: PHYSICIAN ASSISTANT

## 2025-07-31 RX ORDER — LORATADINE 10 MG/1
10 CAPSULE, LIQUID FILLED ORAL DAILY
COMMUNITY

## 2025-07-31 ASSESSMENT — ENCOUNTER SYMPTOMS
COUGH: 0
DIARRHEA: 0
RHINORRHEA: 0
ABDOMINAL PAIN: 0
SINUS PRESSURE: 0
SORE THROAT: 0
SHORTNESS OF BREATH: 0
VOMITING: 0
NAUSEA: 0
WHEEZING: 0

## 2025-07-31 NOTE — PROGRESS NOTES
7/31/2025    Diagnosis       Diagnosis Orders   1. Thyroid condition  Comprehensive Metabolic Panel    T4, Free    TSH reflex to FT4      2. Insulin resistance  Insulin Free & Total             Assessment & Plan  1. Thyroid nodule: Stable.  - Right lobe measured 4.7 x 1.6 cm, left lobe measured 4.6 x 1.7 cm. Right inferior lobe nodule 0.8 x 0.7 cm. TR five but subcentimeter. Thyroid peroxidase antibodies elevated at 89. Thyroglobulin antibodies elevated at 1.8. No palpable thyroid nodules and no swollen lymph nodes. Differential diagnosis of PCOS considered.  - Repeat ultrasound scheduled for June or July 2026.  - Maintain current lifestyle modifications including diet and exercise.  - Monitor symptoms closely.    2. Insulin resistance.  - Insulin level 26.9. Glucose 114. A1c 5.6.  - Continue reducing sugar intake and adhering to a 1400 to 1500 calorie diet.  - Regular exercise emphasized.  - Limit carbohydrate intake and ensure most calories come from vegetables, fruits, and lean proteins.  - Use a calorie counting yeimi to manage diet.  - Consider metformin if insulin levels remain elevated.    3. Gestational diabetes: Resolved.  - History of gestational diabetes during pregnancy.  - Work towards preventing the onset of diabetes through diet and exercise.    Follow-up  - Follow up in 6 months.     Orders Placed This Encounter   Procedures    Comprehensive Metabolic Panel     Standing Status:   Future     Expected Date:   7/31/2025     Expiration Date:   7/31/2026    T4, Free     Standing Status:   Future     Expected Date:   7/31/2025     Expiration Date:   7/31/2026    TSH reflex to FT4     Standing Status:   Future     Expected Date:   7/31/2025     Expiration Date:   7/31/2026    Insulin Free & Total     Standing Status:   Future     Expected Date:   7/31/2025     Expiration Date:   7/31/2026     No orders of the defined types were placed in this encounter.    No follow-ups on file.  Subjective:     Chief

## 2025-08-21 ENCOUNTER — APPOINTMENT (OUTPATIENT)
Dept: GENERAL RADIOLOGY | Age: 25
End: 2025-08-21
Payer: COMMERCIAL

## 2025-08-21 ENCOUNTER — HOSPITAL ENCOUNTER (EMERGENCY)
Age: 25
Discharge: HOME | End: 2025-08-21
Payer: COMMERCIAL

## 2025-08-21 VITALS
RESPIRATION RATE: 18 BRPM | BODY MASS INDEX: 34.97 KG/M2 | WEIGHT: 204.8 LBS | HEART RATE: 85 BPM | SYSTOLIC BLOOD PRESSURE: 127 MMHG | DIASTOLIC BLOOD PRESSURE: 83 MMHG | OXYGEN SATURATION: 99 % | TEMPERATURE: 97.9 F | HEIGHT: 64 IN

## 2025-08-21 DIAGNOSIS — S61.211A LACERATION OF LEFT INDEX FINGER WITHOUT FOREIGN BODY WITHOUT DAMAGE TO NAIL, INITIAL ENCOUNTER: Primary | ICD-10-CM

## 2025-08-21 PROCEDURE — 12001 RPR S/N/AX/GEN/TRNK 2.5CM/<: CPT

## 2025-08-21 PROCEDURE — 6370000000 HC RX 637 (ALT 250 FOR IP): Performed by: PHYSICIAN ASSISTANT

## 2025-08-21 PROCEDURE — 99283 EMERGENCY DEPT VISIT LOW MDM: CPT

## 2025-08-21 PROCEDURE — 6360000002 HC RX W HCPCS: Performed by: PHYSICIAN ASSISTANT

## 2025-08-21 PROCEDURE — 73140 X-RAY EXAM OF FINGER(S): CPT

## 2025-08-21 RX ORDER — IBUPROFEN 800 MG/1
800 TABLET, FILM COATED ORAL ONCE
Status: COMPLETED | OUTPATIENT
Start: 2025-08-21 | End: 2025-08-21

## 2025-08-21 RX ORDER — BACITRACIN ZINC 500 [USP'U]/G
OINTMENT TOPICAL ONCE
Status: COMPLETED | OUTPATIENT
Start: 2025-08-21 | End: 2025-08-21

## 2025-08-21 RX ORDER — LIDOCAINE HYDROCHLORIDE 10 MG/ML
5 INJECTION, SOLUTION EPIDURAL; INFILTRATION; INTRACAUDAL; PERINEURAL ONCE
Status: COMPLETED | OUTPATIENT
Start: 2025-08-21 | End: 2025-08-21

## 2025-08-21 RX ADMIN — LIDOCAINE HYDROCHLORIDE 5 ML: 10 INJECTION, SOLUTION EPIDURAL; INFILTRATION; INTRACAUDAL; PERINEURAL at 16:09

## 2025-08-21 RX ADMIN — IBUPROFEN 800 MG: 800 TABLET, FILM COATED ORAL at 16:10

## 2025-08-21 RX ADMIN — BACITRACIN ZINC: 500 OINTMENT TOPICAL at 16:10

## 2025-08-21 ASSESSMENT — ENCOUNTER SYMPTOMS
DIARRHEA: 0
BACK PAIN: 0
EYE PAIN: 0
SHORTNESS OF BREATH: 0
RHINORRHEA: 0
PHOTOPHOBIA: 0
VOMITING: 0
NAUSEA: 0
SORE THROAT: 0
ABDOMINAL PAIN: 0
COUGH: 0

## 2025-08-21 ASSESSMENT — PAIN DESCRIPTION - LOCATION: LOCATION: HAND

## 2025-08-21 ASSESSMENT — PAIN - FUNCTIONAL ASSESSMENT: PAIN_FUNCTIONAL_ASSESSMENT: 0-10

## 2025-08-21 ASSESSMENT — LIFESTYLE VARIABLES
HOW OFTEN DO YOU HAVE A DRINK CONTAINING ALCOHOL: MONTHLY OR LESS
HOW MANY STANDARD DRINKS CONTAINING ALCOHOL DO YOU HAVE ON A TYPICAL DAY: 1 OR 2

## 2025-08-21 ASSESSMENT — PAIN DESCRIPTION - PAIN TYPE: TYPE: ACUTE PAIN

## 2025-08-21 ASSESSMENT — PAIN SCALES - GENERAL: PAINLEVEL_OUTOF10: 4

## 2025-08-21 ASSESSMENT — PAIN DESCRIPTION - ORIENTATION: ORIENTATION: LEFT

## 2025-08-22 ENCOUNTER — APPOINTMENT (OUTPATIENT)
Dept: OTOLARYNGOLOGY | Facility: CLINIC | Age: 25
End: 2025-08-22
Payer: COMMERCIAL